# Patient Record
Sex: FEMALE | Race: WHITE | URBAN - METROPOLITAN AREA
[De-identification: names, ages, dates, MRNs, and addresses within clinical notes are randomized per-mention and may not be internally consistent; named-entity substitution may affect disease eponyms.]

---

## 2019-04-22 ENCOUNTER — HOSPITAL ENCOUNTER (EMERGENCY)
Age: 34
Discharge: HOME | End: 2019-04-22
Attending: EMERGENCY MEDICINE
Payer: COMMERCIAL

## 2019-04-22 ENCOUNTER — APPOINTMENT (OUTPATIENT)
Dept: GENERAL RADIOLOGY | Age: 34
End: 2019-04-22
Payer: COMMERCIAL

## 2019-04-22 VITALS
TEMPERATURE: 98.1 F | RESPIRATION RATE: 18 BRPM | HEIGHT: 64 IN | HEART RATE: 76 BPM | OXYGEN SATURATION: 100 % | DIASTOLIC BLOOD PRESSURE: 77 MMHG | BODY MASS INDEX: 18.78 KG/M2 | SYSTOLIC BLOOD PRESSURE: 131 MMHG | WEIGHT: 110 LBS

## 2019-04-22 DIAGNOSIS — Z87.01 H/O: PNEUMONIA: ICD-10-CM

## 2019-04-22 DIAGNOSIS — R07.9 CHEST PAIN, UNSPECIFIED TYPE: Primary | ICD-10-CM

## 2019-04-22 DIAGNOSIS — E83.42 HYPOMAGNESEMIA: ICD-10-CM

## 2019-04-22 LAB
A/G RATIO: 1.6 (ref 1.1–2.2)
ALBUMIN SERPL-MCNC: 4.2 G/DL (ref 3.4–5)
ALP BLD-CCNC: 43 U/L (ref 40–129)
ALT SERPL-CCNC: 21 U/L (ref 10–40)
ANION GAP SERPL CALCULATED.3IONS-SCNC: 15 MMOL/L (ref 3–16)
AST SERPL-CCNC: 41 U/L (ref 15–37)
BASOPHILS ABSOLUTE: 0 K/UL (ref 0–0.2)
BASOPHILS RELATIVE PERCENT: 0.3 %
BILIRUB SERPL-MCNC: 0.4 MG/DL (ref 0–1)
BUN BLDV-MCNC: 10 MG/DL (ref 7–20)
CALCIUM SERPL-MCNC: 8.9 MG/DL (ref 8.3–10.6)
CHLORIDE BLD-SCNC: 105 MMOL/L (ref 99–110)
CO2: 20 MMOL/L (ref 21–32)
CREAT SERPL-MCNC: 0.6 MG/DL (ref 0.6–1.1)
EKG ATRIAL RATE: 65 BPM
EKG DIAGNOSIS: NORMAL
EKG P AXIS: 1 DEGREES
EKG P-R INTERVAL: 172 MS
EKG Q-T INTERVAL: 468 MS
EKG QRS DURATION: 82 MS
EKG QTC CALCULATION (BAZETT): 486 MS
EKG R AXIS: 75 DEGREES
EKG T AXIS: 52 DEGREES
EKG VENTRICULAR RATE: 65 BPM
EOSINOPHILS ABSOLUTE: 0 K/UL (ref 0–0.6)
EOSINOPHILS RELATIVE PERCENT: 0 %
GFR AFRICAN AMERICAN: >60
GFR NON-AFRICAN AMERICAN: >60
GLOBULIN: 2.6 G/DL
GLUCOSE BLD-MCNC: 116 MG/DL (ref 70–99)
HCG QUALITATIVE: NEGATIVE
HCT VFR BLD CALC: 38.6 % (ref 36–48)
HEMOGLOBIN: 12.8 G/DL (ref 12–16)
LYMPHOCYTES ABSOLUTE: 1.2 K/UL (ref 1–5.1)
LYMPHOCYTES RELATIVE PERCENT: 13 %
MAGNESIUM: 1.7 MG/DL (ref 1.8–2.4)
MCH RBC QN AUTO: 30.3 PG (ref 26–34)
MCHC RBC AUTO-ENTMCNC: 33.2 G/DL (ref 31–36)
MCV RBC AUTO: 91.2 FL (ref 80–100)
MONOCYTES ABSOLUTE: 0.4 K/UL (ref 0–1.3)
MONOCYTES RELATIVE PERCENT: 4.4 %
NEUTROPHILS ABSOLUTE: 7.7 K/UL (ref 1.7–7.7)
NEUTROPHILS RELATIVE PERCENT: 82.3 %
PDW BLD-RTO: 14 % (ref 12.4–15.4)
PLATELET # BLD: 240 K/UL (ref 135–450)
PMV BLD AUTO: 9.1 FL (ref 5–10.5)
POTASSIUM REFLEX MAGNESIUM: 3.5 MMOL/L (ref 3.5–5.1)
RBC # BLD: 4.24 M/UL (ref 4–5.2)
SODIUM BLD-SCNC: 140 MMOL/L (ref 136–145)
TOTAL PROTEIN: 6.8 G/DL (ref 6.4–8.2)
TROPONIN: <0.01 NG/ML
WBC # BLD: 9.3 K/UL (ref 4–11)

## 2019-04-22 PROCEDURE — 85025 COMPLETE CBC W/AUTO DIFF WBC: CPT

## 2019-04-22 PROCEDURE — 71046 X-RAY EXAM CHEST 2 VIEWS: CPT

## 2019-04-22 PROCEDURE — 93010 ELECTROCARDIOGRAM REPORT: CPT | Performed by: INTERNAL MEDICINE

## 2019-04-22 PROCEDURE — 84484 ASSAY OF TROPONIN QUANT: CPT

## 2019-04-22 PROCEDURE — 6360000002 HC RX W HCPCS: Performed by: NURSE PRACTITIONER

## 2019-04-22 PROCEDURE — 96374 THER/PROPH/DIAG INJ IV PUSH: CPT

## 2019-04-22 PROCEDURE — 83735 ASSAY OF MAGNESIUM: CPT

## 2019-04-22 PROCEDURE — 93005 ELECTROCARDIOGRAM TRACING: CPT | Performed by: EMERGENCY MEDICINE

## 2019-04-22 PROCEDURE — 84703 CHORIONIC GONADOTROPIN ASSAY: CPT

## 2019-04-22 PROCEDURE — 80053 COMPREHEN METABOLIC PANEL: CPT

## 2019-04-22 PROCEDURE — 99285 EMERGENCY DEPT VISIT HI MDM: CPT

## 2019-04-22 PROCEDURE — 6370000000 HC RX 637 (ALT 250 FOR IP): Performed by: EMERGENCY MEDICINE

## 2019-04-22 RX ORDER — LORAZEPAM 2 MG/ML
1 INJECTION INTRAMUSCULAR ONCE
Status: COMPLETED | OUTPATIENT
Start: 2019-04-22 | End: 2019-04-22

## 2019-04-22 RX ADMIN — LORAZEPAM 1 MG: 2 INJECTION INTRAMUSCULAR; INTRAVENOUS at 14:45

## 2019-04-22 RX ADMIN — Medication 400 MG: at 15:56

## 2019-04-22 ASSESSMENT — ENCOUNTER SYMPTOMS
SHORTNESS OF BREATH: 0
NAUSEA: 0
CONSTIPATION: 0
DIARRHEA: 0
BLOOD IN STOOL: 0
VOMITING: 0
RHINORRHEA: 0
SORE THROAT: 0
ABDOMINAL PAIN: 0

## 2019-04-22 ASSESSMENT — PAIN DESCRIPTION - LOCATION: LOCATION: CHEST

## 2019-04-22 ASSESSMENT — PAIN SCALES - GENERAL: PAINLEVEL_OUTOF10: 9

## 2019-04-22 ASSESSMENT — HEART SCORE: ECG: 0

## 2019-04-22 ASSESSMENT — PAIN DESCRIPTION - ORIENTATION: ORIENTATION: MID

## 2019-04-22 NOTE — ED PROVIDER NOTES
[address] [facility]        Pt Name: Marissa Salvador  MRN: [id number]  [place] 1985  Date of evaluation: 4/22/2019  Provider: OVIDIO Shearer - MELIDA  PCP: Bambi Sosa [id number] [facility] 9 COMPLAINT       Chief Complaint   Patient presents with    Chest Pain     pt brought in by [facility] from [facility]. Pt c/o CP. Per squad pt reports she has been to [address] x3 since Sunday 4/21. Yesterday was dx with Pneumonia but sts \"i didnt fill my medication cause i felt too bad. \" Pt was dx with anxiety at [address] this am. Pt sts \"they didnt do anything. \" Pt NSR during triage but continues to say \"its doing the going tight thing.' EKG done at arrival.        HISTORY OF PRESENT ILLNESS   (Location/Symptom, Timing/Onset,Context/Setting, Quality, Duration, Modifying Factors, Severity)  Note limiting factors. Marissa Salvador is a 30 y.o. female who presents here with concern for chest pain. This occurred while she was at the pharmacy picking up a prescription for pneumonia. She reports it was left-sided and caused her to pass out. She also reports that anxiety. She denies fever, rash, headaches, dizziness, shortness of breath, cough, congestion, abdominal pain, nausea, vomiting, diarrhea, constipation, blood in the stool, or painful urination. No family at bedside. Nursing Notes triage note reviewed and agreed with or any disagreements were addressed  in the HPI. REVIEW OF SYSTEMS    (2-9 systems for level 4, 10 or more for level 5)     Review of Systems   Constitutional: Negative for chills and fever. HENT: Negative for postnasal drip, rhinorrhea and sore throat. Eyes: Negative for visual disturbance. Respiratory: Negative for shortness of breath. Cardiovascular: Positive for chest pain. Gastrointestinal: Negative for abdominal pain, blood in stool, constipation, diarrhea, nausea and vomiting.    Genitourinary: Negative for dysuria, flank pain and hematuria. Skin: Negative for rash. Neurological: Negative for weakness and headaches. Psychiatric/Behavioral: The patient is nervous/anxious. All other systems reviewed and are negative. Positives and Pertinent negatives as per HPI. Except as noted above in the ROS, all other systems were reviewed and negative. PAST MEDICAL HISTORY     Past Medical History:   Diagnosis Date    ADHD (attention deficit hyperactivity disorder)     Asthma     Collapsed lung     Drug abuse ([facility] [address]     Kidney stones     Pituitary tumor     PNA (pneumonia)     Tonsillitis          SURGICAL HISTORY       Past Surgical History:   Procedure Laterality Date    FOOT SURGERY      RIGHT -- UNSURE WHY    MOUTH SURGERY           CURRENT MEDICATIONS       Previous Medications    IBUPROFEN (ADVIL;MOTRIN) 600 MG TABLET    Take 1 tablet by mouth every 8 hours as needed for Pain         ALLERGIES     Adhesive tape; Amoxicillin; Benadryl [diphenhydramine]; Decadron [dexamethasone]; Penicillins; Phenergan [promethazine hcl]; Prochlorperazine edisylate; Reglan [metoclopramide hcl]; and Stadol [butorphanol tartrate]    FAMILY HISTORY     History reviewed. No pertinent family history.        SOCIAL HISTORY       Social History     Socioeconomic History    Marital status: Legally      Spouse name: None    Number of children: None    Years of education: None    Highest education level: None   Occupational History    None   Social Needs    Financial resource strain: None    Food insecurity:     Worry: None     Inability: None    Transportation needs:     Medical: None     Non-medical: None   Tobacco Use    Smoking status: Current Every Day Smoker     Packs/day: 0.50     Types: Cigarettes    Smokeless tobacco: Never Used   Substance and Sexual Activity    Alcohol use: Yes     Comment: once every few months    Drug use: No    Sexual activity: None   Lifestyle    Physical activity: Days per week: None     Minutes per session: None    Stress: None   Relationships    Social connections:     Talks on phone: None     Gets together: None     Attends Temple service: None     Active member of club or organization: None     Attends meetings of clubs or organizations: None     Relationship status: None    Intimate partner violence:     Fear of current or ex partner: None     Emotionally abused: None     Physically abused: None     Forced sexual activity: None   Other Topics Concern    None   Social History Narrative    None       SCREENINGS             PHYSICAL EXAM  (up to 7 for level 4, 8 or more for level 5)     ED Triage Vitals   BP Temp Temp Source Pulse Resp SpO2 Height Weight   04/22/19 1400 04/22/19 1403 04/22/19 1403 04/22/19 1403 04/22/19 1403 04/22/19 1403 04/22/19 1403 04/22/19 1403   (!) 147/99 98.1 °F (36.7 °C) Oral 70 20 100 % 5' 4\" (1.626 m) 110 lb (49.9 kg)       Physical Exam   Constitutional: She is oriented to person, place, and time. She appears well-developed and well-nourished. No distress. HENT:   Head: Normocephalic and atraumatic. Eyes: Right eye exhibits no discharge. Left eye exhibits no discharge. No scleral icterus. Neck: Normal range of motion. Neck supple. Cardiovascular: Normal rate, regular rhythm, normal heart sounds and intact distal pulses. Exam reveals no gallop and no friction rub. No murmur heard. Pulmonary/Chest: Effort normal and breath sounds normal. No stridor. No respiratory distress. She has no wheezes. She has no rales. She exhibits no tenderness. Abdominal: Soft. Bowel sounds are normal. She exhibits no distension and no mass. There is no tenderness. There is no rebound and no guarding. Musculoskeletal: Normal range of motion. She exhibits no edema or tenderness. Neurological: She is alert and oriented to person, place, and time. Coordination normal.   Skin: Skin is warm and dry. She is not diaphoretic. No pallor.    Psychiatric: She has a normal mood and affect. Her behavior is normal.   Nursing note and vitals reviewed. DIAGNOSTIC RESULTS   LABS:    Labs Reviewed   COMPREHENSIVE METABOLIC PANEL W/ REFLEX TO MG FOR LOW K - Abnormal; Notable for the following components:       Result Value    CO2 20 (*)     Glucose 116 (*)     AST 41 (*)     All other components within normal limits    Narrative:     Performed at:  [facility] [address],  [facility], [address]   Phone [phone]   MAGNESIUM - Abnormal; Notable for the following components:    Magnesium 1.70 (*)     All other components within normal limits    Narrative:     Performed at:  [facility] [address],  [facility], [address]   Phone [phone]   CBC WITH AUTO DIFFERENTIAL    Narrative:     Performed at:  [facility] [address],  [facility], [address]   Phone [phone]   TROPONIN    Narrative:     Performed at:  [facility] [address],  [facility], [address]   Phone [phone]   HCG, SERUM, QUALITATIVE    Narrative:     Performed at:  [facility] [address],  [facility], [address]   Phone [phone]       All other labs werewithin normal range or not returned as of this dictation. EKG: All EKG's are interpreted by the Emergency Department Physician who either signs or Co-signs this chart in the absence of acardiologist.  Please see their note for interpretation of EKG. RADIOLOGY:   Interpretation per the Radiologist below, if available at the time of this note:    XR CHEST STANDARD (2 VW)   Preliminary Result   No evidence of acute cardiopulmonary disease. No results found.       PROCEDURES   Unless otherwise noted below, none     Procedures    CRITICAL CARE TIME     n/a    CONSULTS:  None      EMERGENCY DEPARTMENT COURSE and DIFFERENTIAL DIAGNOSIS/MDM:   Vitals: Vitals:    04/22/19 1400 04/22/19 1403 04/22/19 1415   BP: (!) 147/99 (!) 141/91    Pulse:  70 65   Resp:  20 18   Temp:  98.1 °F (36.7 °C)    TempSrc:  Oral    SpO2:  100% 100%   Weight:  110 lb (49.9 kg)    Height:  5' 4\" (1.626 m)        Sinai Hadley was given the following medications:  Medications   magnesium oxide (MAG-OX) tablet 400 mg (has no administration in time range)   LORazepam (ATIVAN) injection 1 mg (1 mg Intravenous Given 4/22/19 1445)       Sinai Hadley was evaluated in the emergency department with concern for chest pain. She reports anxiety in the ER. She was Ativan. Identified to have low magnesium and was treated in the ER for this. Otherwise workup is benign. Her EKG is nonischemic. Troponin is not elevated. I doubt emergent intrathoracic process including but not limited to coronary ischemia, myocardial infarction, cardiac tamponade, pulmonary embolism, thoracic aortic dissection, significant pericarditis, pneumonia, pneumothorax, or acute abdomen. Sinai Hadley is stable in the ER and safe to follow as an outpatient. The patient is discharged on the following medications. They were counseled on how to take the newly prescribed medications:  New Prescriptions    No medications on file    . Instructed to follow-up with:  [address]  Call in 1 day  to schedule an appointment with a new primary care provider    Return to the ER for new or worsening symptoms. This plan was discussed with the patient and all family available in the room at discharge who are all in agreement with the plan. The patient tolerated their visit well. They were seen and evaluated by the attending physician, Danielle Ley MD , who agreed with the assessment and plan. The patient and / or the family were informed of the results of any tests, a time was given to answer questions, a plan was proposed and they agreed with plan.         FINAL IMPRESSION 1. Chest pain, unspecified type    2. H/O: pneumonia    3.  Hypomagnesemia          DISPOSITION/PLAN   DISPOSITION Decision To Discharge 04/22/2019 03:34:42 PM        DISCONTINUED MEDICATIONS:  Discontinued Medications    No medications on file                (Please note that portions of this note were completed with a voice recognition program.  Efforts were made to edit the dictations but occasionally words are mis-transcribed.)    OVIDIO Shearer CNP (electronically signed)        VOIDIO Shearer CNP  04/22/19 9835

## 2019-04-22 NOTE — ED PROVIDER NOTES
I independently performed a history and physical on [employer]. All diagnostic, treatment, and disposition decisions were made by myself in conjunction with the advanced practice provider. I have participated in the medical decision making and directed the treatment plan and disposition of the patient. For further details of [address] emergency department encounter, please see the advanced practice provider's documentation. CHIEF COMPLAINT  Chief Complaint   Patient presents with    Chest Pain     pt brought in by [facility] from [facility]. Pt c/o CP. Per silvano pt reports she has been to [address] x3 since Sunday 4/21. Yesterday was dx with Pneumonia but sts \"i didnt fill my medication cause i felt too bad. \" Pt was dx with anxiety at [address] this am. Pt sts \"they didnt do anything. \" Pt NSR during triage but continues to say \"its doing the going tight thing.' EKG done at arrival.        Briefly, [employer] is a 30 y.o. female  who presents to the ED complaining of chest pains. She reports diffuse paresthesias. No fevers. She feels very anxious but is vague about what actually makes her anxious. She has some nausea. She was filling a prescription for a possible pneumonia from outside hospital.  This is based on an equivocal finding on OSH CXR. FOCUSED PHYSICAL EXAMINATION  BP (!) 141/91   Pulse 65   Temp 98.1 °F (36.7 °C) (Oral)   Resp 18   Ht 5' 4\" (1.626 m)   Wt 110 lb (49.9 kg)   SpO2 100%   BMI 18.88 kg/m²    Focused physical examination notable for no acute distress, well-appearing, well-nourished, normal speech and mentation without obvious facial droop, no obvious rash. No obvious cranial nerve deficits on my initial exam. RRR CTAB. No chest wall ttp. No leg swelling.     The 12 lead EKG was interpreted by me as follows:  Rate: normal with a rate of 65  Rhythm: sinus  Axis: normal  Intervals: normal ND, narrow QRS, normal QTc  ST segments: no ST elevations or depressions  T waves: no abnormal inversions  Non-specific T wave changes: not present  Prior EKG comparison: EKG dated 4/14/14 is not significantly different    MDM:  Diagnostic considerations included acute coronary syndrome, pulmonary embolism, COPD/asthma, pneumonia, musculoskeletal, reflux/PUD/gastritis, pneumothorax, CHF, thoracic aortic dissection, anxiety    ED course was notable for chest discomfort, probable anxiety driven, fourth ER visit for somewhat chronic problem in the past 48 hours. Already has outpt rx for abx but CXR here is clear. CareEverywhere reviewed. Minimally low magnesium levels repleted by mouth. New primary care referral was made at her request.  PERC neg. Trop neg, EKG unremarkable and low HEART score. PERC Rule:  Applicable in this patient who has low clinical suspicion for pulmonary embolism. Age < 48years old: Yes  Heart rate < 100 bpm: Yes  Oxygen saturation > 95%: Yes  Hemoptysis: No  Exogenous estrogen use: No  Prior history of DVT or PE: No  Unilateral leg swelling: No  Surgery or significant trauma in the past 4 weeks: No    Based on the above, PE can effectively be ruled out without further testing. During the patient's ED course, the patient was given:  Medications   magnesium oxide (MAG-OX) tablet 400 mg (has no administration in time range)   LORazepam (ATIVAN) injection 1 mg (1 mg Intravenous Given 4/22/19 1445)        CLINICAL IMPRESSION  1. Chest pain, unspecified type    2. H/O: pneumonia    3. Hypomagnesemia        DISPOSITION  Gloria Portillo was discharged to home in stable condition. I have discussed the findings of today's workup with the patient and addressed the patient's questions and concerns. Important warning signs as well as new or worsening symptoms which would necessitate immediate return to the ED were discussed. The plan is to discharge from the ED at this time, and the patient is in stable condition.   The patient acknowledged

## 2019-04-22 NOTE — ED NOTES
Pt d/c instructions given, v/u. IV removed without complications. A&O with no signs of distress. Denies needs at this time. Pt wheeled to exit.         Andree Altamirano RN  04/22/19 4897

## 2019-04-22 NOTE — ED NOTES
Pt a&o x4. Pt c/o CP x1 week. Pt has had recent cough and dx with Pneumonia yesterday at [address]. Has had 3 visits to [facility] since Sunday 4/21 AM. Pt was d/c last night and returned this AM to [address] and dx with anxiety. Pt has hx of anxiety. IV established by squads without complications, blood work obtained by this RN and sent to lab. Pt tolerated well. Pt placed on appropriate monitors and cycling. NSR with a HR of 60s-70s. ST segment alarm enabled. Pt refused to place on a gown at this time. Pt laying supine in bed in low position, call light in reach, side rails up x2. Pt showing no signs of distress at this time. Breathing easy and unlabored. Will continue to monitor.         Shannon Rose RN  04/22/19 7636

## 2019-04-22 NOTE — ED NOTES
Bed: 06  Expected date:   Expected time:   Means of arrival: [facility]  Comments:  Mu Toro  04/22/19 5490

## 2020-03-16 ENCOUNTER — APPOINTMENT (OUTPATIENT)
Dept: GENERAL RADIOLOGY | Age: 35
DRG: 383 | End: 2020-03-16
Payer: COMMERCIAL

## 2020-03-16 ENCOUNTER — HOSPITAL ENCOUNTER (EMERGENCY)
Age: 35
Discharge: HOME | DRG: 383 | End: 2020-03-16
Attending: EMERGENCY MEDICINE
Payer: COMMERCIAL

## 2020-03-16 ENCOUNTER — HOSPITAL ENCOUNTER (INPATIENT)
Age: 35
LOS: 1 days | Discharge: LEFT BEFORE DISCHARGE | DRG: 383 | End: 2020-03-17
Attending: EMERGENCY MEDICINE | Admitting: INTERNAL MEDICINE
Payer: COMMERCIAL

## 2020-03-16 VITALS
HEIGHT: 64 IN | WEIGHT: 110 LBS | RESPIRATION RATE: 18 BRPM | DIASTOLIC BLOOD PRESSURE: 89 MMHG | HEART RATE: 70 BPM | SYSTOLIC BLOOD PRESSURE: 125 MMHG | BODY MASS INDEX: 18.78 KG/M2 | OXYGEN SATURATION: 98 % | TEMPERATURE: 98.7 F

## 2020-03-16 PROBLEM — L03.90 CELLULITIS: Status: ACTIVE | Noted: 2020-03-16

## 2020-03-16 LAB
ANION GAP SERPL CALCULATED.3IONS-SCNC: 19 MMOL/L (ref 3–16)
BASOPHILS ABSOLUTE: 0.1 K/UL (ref 0–0.2)
BASOPHILS RELATIVE PERCENT: 0.6 %
BUN BLDV-MCNC: 18 MG/DL (ref 7–20)
C-REACTIVE PROTEIN: 11.1 MG/L (ref 0–5.1)
CALCIUM SERPL-MCNC: 9.6 MG/DL (ref 8.3–10.6)
CHLORIDE BLD-SCNC: 102 MMOL/L (ref 99–110)
CO2: 19 MMOL/L (ref 21–32)
CREAT SERPL-MCNC: 0.7 MG/DL (ref 0.6–1.1)
EOSINOPHILS ABSOLUTE: 0.1 K/UL (ref 0–0.6)
EOSINOPHILS RELATIVE PERCENT: 0.5 %
GFR AFRICAN AMERICAN: >60
GFR NON-AFRICAN AMERICAN: >60
GLUCOSE BLD-MCNC: 96 MG/DL (ref 70–99)
HCT VFR BLD CALC: 35.7 % (ref 36–48)
HEMOGLOBIN: 12 G/DL (ref 12–16)
LACTIC ACID, SEPSIS: 1.8 MMOL/L (ref 0.4–1.9)
LYMPHOCYTES ABSOLUTE: 2.5 K/UL (ref 1–5.1)
LYMPHOCYTES RELATIVE PERCENT: 21.8 %
MCH RBC QN AUTO: 29.6 PG (ref 26–34)
MCHC RBC AUTO-ENTMCNC: 33.5 G/DL (ref 31–36)
MCV RBC AUTO: 88.4 FL (ref 80–100)
MONOCYTES ABSOLUTE: 0.7 K/UL (ref 0–1.3)
MONOCYTES RELATIVE PERCENT: 6.2 %
NEUTROPHILS ABSOLUTE: 8.1 K/UL (ref 1.7–7.7)
NEUTROPHILS RELATIVE PERCENT: 70.9 %
PDW BLD-RTO: 16 % (ref 12.4–15.4)
PLATELET # BLD: 336 K/UL (ref 135–450)
PMV BLD AUTO: 8.4 FL (ref 5–10.5)
POTASSIUM REFLEX MAGNESIUM: 3.6 MMOL/L (ref 3.5–5.1)
RBC # BLD: 4.04 M/UL (ref 4–5.2)
SEDIMENTATION RATE, ERYTHROCYTE: 29 MM/HR (ref 0–20)
SODIUM BLD-SCNC: 140 MMOL/L (ref 136–145)
WBC # BLD: 11.5 K/UL (ref 4–11)

## 2020-03-16 PROCEDURE — 6360000002 HC RX W HCPCS: Performed by: PHYSICIAN ASSISTANT

## 2020-03-16 PROCEDURE — 90471 IMMUNIZATION ADMIN: CPT | Performed by: PHYSICIAN ASSISTANT

## 2020-03-16 PROCEDURE — 85025 COMPLETE CBC W/AUTO DIFF WBC: CPT

## 2020-03-16 PROCEDURE — 99284 EMERGENCY DEPT VISIT MOD MDM: CPT

## 2020-03-16 PROCEDURE — 83605 ASSAY OF LACTIC ACID: CPT

## 2020-03-16 PROCEDURE — 2580000003 HC RX 258: Performed by: INTERNAL MEDICINE

## 2020-03-16 PROCEDURE — 1200000000 HC SEMI PRIVATE

## 2020-03-16 PROCEDURE — 90675 RABIES VACCINE IM: CPT | Performed by: PHYSICIAN ASSISTANT

## 2020-03-16 PROCEDURE — 86140 C-REACTIVE PROTEIN: CPT

## 2020-03-16 PROCEDURE — 2580000003 HC RX 258: Performed by: PHYSICIAN ASSISTANT

## 2020-03-16 PROCEDURE — 87040 BLOOD CULTURE FOR BACTERIA: CPT

## 2020-03-16 PROCEDURE — G0378 HOSPITAL OBSERVATION PER HR: HCPCS

## 2020-03-16 PROCEDURE — 99283 EMERGENCY DEPT VISIT LOW MDM: CPT

## 2020-03-16 PROCEDURE — 96365 THER/PROPH/DIAG IV INF INIT: CPT

## 2020-03-16 PROCEDURE — 96372 THER/PROPH/DIAG INJ SC/IM: CPT

## 2020-03-16 PROCEDURE — 73130 X-RAY EXAM OF HAND: CPT

## 2020-03-16 PROCEDURE — 6370000000 HC RX 637 (ALT 250 FOR IP): Performed by: INTERNAL MEDICINE

## 2020-03-16 PROCEDURE — 6360000002 HC RX W HCPCS: Performed by: INTERNAL MEDICINE

## 2020-03-16 PROCEDURE — 6370000000 HC RX 637 (ALT 250 FOR IP): Performed by: NURSE PRACTITIONER

## 2020-03-16 PROCEDURE — 96376 TX/PRO/DX INJ SAME DRUG ADON: CPT

## 2020-03-16 PROCEDURE — 96368 THER/DIAG CONCURRENT INF: CPT

## 2020-03-16 PROCEDURE — 80048 BASIC METABOLIC PNL TOTAL CA: CPT

## 2020-03-16 PROCEDURE — 90375 RABIES IG IM/SC: CPT | Performed by: PHYSICIAN ASSISTANT

## 2020-03-16 PROCEDURE — 80307 DRUG TEST PRSMV CHEM ANLYZR: CPT

## 2020-03-16 PROCEDURE — 85652 RBC SED RATE AUTOMATED: CPT

## 2020-03-16 PROCEDURE — 96375 TX/PRO/DX INJ NEW DRUG ADDON: CPT

## 2020-03-16 PROCEDURE — 99219 PR INITIAL OBSERVATION CARE/DAY 50 MINUTES: CPT | Performed by: INTERNAL MEDICINE

## 2020-03-16 PROCEDURE — 96366 THER/PROPH/DIAG IV INF ADDON: CPT

## 2020-03-16 PROCEDURE — 96367 TX/PROPH/DG ADDL SEQ IV INF: CPT

## 2020-03-16 PROCEDURE — 6370000000 HC RX 637 (ALT 250 FOR IP): Performed by: PHYSICIAN ASSISTANT

## 2020-03-16 RX ORDER — ACETAMINOPHEN, ASPIRIN AND CAFFEINE 250; 250; 65 MG/1; MG/1; MG/1
1 TABLET, FILM COATED ORAL EVERY 6 HOURS PRN
Status: ON HOLD | COMMUNITY
End: 2021-05-27

## 2020-03-16 RX ORDER — HYDROXYZINE HYDROCHLORIDE 10 MG/1
10 TABLET, FILM COATED ORAL ONCE
Status: COMPLETED | OUTPATIENT
Start: 2020-03-16 | End: 2020-03-16

## 2020-03-16 RX ORDER — METRONIDAZOLE 250 MG/1
500 TABLET ORAL EVERY 8 HOURS SCHEDULED
Status: DISCONTINUED | OUTPATIENT
Start: 2020-03-16 | End: 2020-03-17 | Stop reason: HOSPADM

## 2020-03-16 RX ORDER — ACETAMINOPHEN 650 MG/1
650 SUPPOSITORY RECTAL EVERY 6 HOURS PRN
Status: DISCONTINUED | OUTPATIENT
Start: 2020-03-16 | End: 2020-03-17 | Stop reason: HOSPADM

## 2020-03-16 RX ORDER — MORPHINE SULFATE 4 MG/ML
4 INJECTION, SOLUTION INTRAMUSCULAR; INTRAVENOUS
Status: DISCONTINUED | OUTPATIENT
Start: 2020-03-16 | End: 2020-03-17

## 2020-03-16 RX ORDER — IBUPROFEN 600 MG/1
600 TABLET ORAL ONCE
Status: COMPLETED | OUTPATIENT
Start: 2020-03-16 | End: 2020-03-16

## 2020-03-16 RX ORDER — ACETAMINOPHEN 500 MG
1000 TABLET ORAL ONCE
Status: DISCONTINUED | OUTPATIENT
Start: 2020-03-16 | End: 2020-03-16 | Stop reason: HOSPADM

## 2020-03-16 RX ORDER — SODIUM CHLORIDE 0.9 % (FLUSH) 0.9 %
10 SYRINGE (ML) INJECTION EVERY 12 HOURS SCHEDULED
Status: DISCONTINUED | OUTPATIENT
Start: 2020-03-16 | End: 2020-03-17 | Stop reason: HOSPADM

## 2020-03-16 RX ORDER — SODIUM CHLORIDE 0.9 % (FLUSH) 0.9 %
10 SYRINGE (ML) INJECTION PRN
Status: DISCONTINUED | OUTPATIENT
Start: 2020-03-16 | End: 2020-03-17 | Stop reason: HOSPADM

## 2020-03-16 RX ORDER — ONDANSETRON 2 MG/ML
4 INJECTION INTRAMUSCULAR; INTRAVENOUS ONCE
Status: COMPLETED | OUTPATIENT
Start: 2020-03-16 | End: 2020-03-16

## 2020-03-16 RX ORDER — POLYETHYLENE GLYCOL 3350 17 G/17G
17 POWDER, FOR SOLUTION ORAL DAILY PRN
Status: DISCONTINUED | OUTPATIENT
Start: 2020-03-16 | End: 2020-03-17 | Stop reason: HOSPADM

## 2020-03-16 RX ORDER — ACETAMINOPHEN 325 MG/1
650 TABLET ORAL EVERY 6 HOURS PRN
Status: DISCONTINUED | OUTPATIENT
Start: 2020-03-16 | End: 2020-03-17 | Stop reason: HOSPADM

## 2020-03-16 RX ORDER — DOXYCYCLINE HYCLATE 100 MG
100 TABLET ORAL ONCE
Status: COMPLETED | OUTPATIENT
Start: 2020-03-16 | End: 2020-03-16

## 2020-03-16 RX ORDER — CIPROFLOXACIN 500 MG/1
500 TABLET, FILM COATED ORAL EVERY 12 HOURS SCHEDULED
Status: DISCONTINUED | OUTPATIENT
Start: 2020-03-16 | End: 2020-03-17 | Stop reason: HOSPADM

## 2020-03-16 RX ORDER — ONDANSETRON 4 MG/1
4 TABLET, FILM COATED ORAL EVERY 12 HOURS PRN
Status: ON HOLD | COMMUNITY
End: 2021-05-27

## 2020-03-16 RX ADMIN — Medication 10 ML: at 11:46

## 2020-03-16 RX ADMIN — VANCOMYCIN HYDROCHLORIDE 750 MG: 750 INJECTION, POWDER, LYOPHILIZED, FOR SOLUTION INTRAVENOUS at 18:14

## 2020-03-16 RX ADMIN — CEFTRIAXONE 2 G: 2 INJECTION, POWDER, FOR SOLUTION INTRAMUSCULAR; INTRAVENOUS at 06:28

## 2020-03-16 RX ADMIN — HYDROXYZINE HYDROCHLORIDE 10 MG: 10 TABLET, FILM COATED ORAL at 21:52

## 2020-03-16 RX ADMIN — MORPHINE SULFATE 4 MG: 4 INJECTION INTRAVENOUS at 06:28

## 2020-03-16 RX ADMIN — VANCOMYCIN HYDROCHLORIDE 750 MG: 750 INJECTION, POWDER, LYOPHILIZED, FOR SOLUTION INTRAVENOUS at 06:34

## 2020-03-16 RX ADMIN — MORPHINE SULFATE 4 MG: 4 INJECTION INTRAVENOUS at 14:28

## 2020-03-16 RX ADMIN — MORPHINE SULFATE 4 MG: 4 INJECTION INTRAVENOUS at 18:15

## 2020-03-16 RX ADMIN — DOXYCYCLINE HYCLATE 100 MG: 100 TABLET, COATED ORAL at 02:40

## 2020-03-16 RX ADMIN — IBUPROFEN 600 MG: 600 TABLET, FILM COATED ORAL at 01:11

## 2020-03-16 RX ADMIN — MORPHINE SULFATE 4 MG: 4 INJECTION INTRAVENOUS at 21:52

## 2020-03-16 RX ADMIN — ONDANSETRON 4 MG: 2 INJECTION INTRAMUSCULAR; INTRAVENOUS at 06:28

## 2020-03-16 RX ADMIN — METRONIDAZOLE 500 MG: 250 TABLET, FILM COATED ORAL at 16:31

## 2020-03-16 RX ADMIN — MORPHINE SULFATE 4 MG: 4 INJECTION INTRAVENOUS at 11:44

## 2020-03-16 RX ADMIN — CIPROFLOXACIN HYDROCHLORIDE 500 MG: 500 TABLET, FILM COATED ORAL at 16:28

## 2020-03-16 RX ADMIN — Medication 1 G: at 08:37

## 2020-03-16 RX ADMIN — RABIES VACCINE 1 ML: KIT at 02:24

## 2020-03-16 RX ADMIN — MORPHINE SULFATE 4 MG: 4 INJECTION INTRAVENOUS at 08:38

## 2020-03-16 RX ADMIN — Medication 10 ML: at 08:38

## 2020-03-16 RX ADMIN — METRONIDAZOLE 500 MG: 250 TABLET, FILM COATED ORAL at 23:15

## 2020-03-16 RX ADMIN — Medication 10 ML: at 18:16

## 2020-03-16 RX ADMIN — RABIES IMMUNE GLOBULIN (HUMAN) 1050 UNITS: 300 INJECTION, SOLUTION INFILTRATION; INTRAMUSCULAR at 02:25

## 2020-03-16 ASSESSMENT — ENCOUNTER SYMPTOMS
STRIDOR: 0
EYE DISCHARGE: 0
COUGH: 0
FACIAL SWELLING: 0
COLOR CHANGE: 0
BACK PAIN: 0
RHINORRHEA: 0
COLOR CHANGE: 1
VOMITING: 0
BLOOD IN STOOL: 0
TROUBLE SWALLOWING: 0
SHORTNESS OF BREATH: 0
PHOTOPHOBIA: 0
APNEA: 0
DIARRHEA: 0
CHOKING: 0
ABDOMINAL PAIN: 0
CHEST TIGHTNESS: 0
NAUSEA: 0
EYE REDNESS: 0

## 2020-03-16 ASSESSMENT — PAIN DESCRIPTION - PROGRESSION
CLINICAL_PROGRESSION: GRADUALLY WORSENING
CLINICAL_PROGRESSION: GRADUALLY WORSENING

## 2020-03-16 ASSESSMENT — PAIN SCALES - GENERAL
PAINLEVEL_OUTOF10: 4
PAINLEVEL_OUTOF10: 0
PAINLEVEL_OUTOF10: 10
PAINLEVEL_OUTOF10: 7
PAINLEVEL_OUTOF10: 9
PAINLEVEL_OUTOF10: 7
PAINLEVEL_OUTOF10: 7
PAINLEVEL_OUTOF10: 10
PAINLEVEL_OUTOF10: 9
PAINLEVEL_OUTOF10: 10
PAINLEVEL_OUTOF10: 7

## 2020-03-16 ASSESSMENT — PAIN DESCRIPTION - PAIN TYPE: TYPE: ACUTE PAIN

## 2020-03-16 NOTE — PROGRESS NOTES
Shift assessment and AM vitals complete. Pain meds given to pt. Waiting for orthopedics to come see pt to see about surgery. The care plan and education has been reviewed and mutually agreed upon with the patient.

## 2020-03-16 NOTE — ED NOTES
Report called to RN on 4T. Pt alert and oriented and shows no signs of distress at time of transfer to  484. Pt taken but to room by WAYLON Hammer in wheelchair. Pt refusing gown at this time. Belongings bagged and sent with patient.        Hamlet Ellis RN  03/16/20 2472

## 2020-03-16 NOTE — H&P
agitation. Endocrine: Negative for polydipsia,polyuria,heat /cold intolerance. Past Medical History:   has a past medical history of ADHD (attention deficit hyperactivity disorder), Asthma, Collapsed lung, Drug abuse ([address] Kidney stones, Pituitary tumor, PNA (pneumonia), and Tonsillitis. Past Surgical History:   has a past surgical history that includes Foot surgery and Mouth surgery. Medications:  No current facility-administered medications on file prior to encounter. No current outpatient medications on file prior to encounter. Allergies: Allergies   Allergen Reactions    Adhesive Tape      IS OK WITH TEGADERM    Amoxicillin     Benadryl [Diphenhydramine]     Decadron [Dexamethasone]     Penicillins     Phenergan [Promethazine Hcl]     Prochlorperazine Edisylate     Reglan [Metoclopramide Hcl]     Stadol [Butorphanol Tartrate]         Social History:  Patient Lives at home   reports that she has been smoking cigarettes. She has been smoking about 0.50 packs per day. She has never used smokeless tobacco. She reports current alcohol use. She reports current drug use. Drug: Marijuana. Family History:  Father wit hDM    Physical Exam:  /74   Pulse 76   Temp 98.3 °F (36.8 °C)   Resp 14   Ht 5' 4\" (1.626 m)   Wt 110 lb (49.9 kg)   SpO2 98%   BMI 18.88 kg/m²     General appearance:  Appears comfortable. AAOx3  HEENT: atraumatic, Pupils equal, muscous membranes moist, no masses appreciated  Cardiovascular: Regular rate and rhythm no murmurs appreciated  Respiratory: CTAB no wheezing  Gastrointestinal: Abdomen soft, non-tender, BS+  EXT: no edema  Neurology: no gross focal deficts  Psychiatry: Appropriate affect.  Not agitated  Skin: right and left hand with scratches and erythema tender to palpation, unable to make fist with both hand and unable to move 1/2nd digit of left hand    Labs:  CBC:   Lab Results   Component Value Date    WBC 11.5 03/16/2020    RBC 4.04

## 2020-03-16 NOTE — ED NOTES
Patient unable to stay for admission at this time, patient states she will return after taking care of things at home, patient left AMA at this time      Nano Marquez RN  03/16/20 [id number]

## 2020-03-16 NOTE — ED PROVIDER NOTES
CONTINUE these medications which have NOT CHANGED    Details   ibuprofen (ADVIL;MOTRIN) 600 MG tablet Take 1 tablet by mouth every 8 hours as needed for Pain, Disp-20 tablet, R-0               ALLERGIES     Adhesive tape; Amoxicillin; Benadryl [diphenhydramine]; Decadron [dexamethasone]; Penicillins; Phenergan [promethazine hcl]; Prochlorperazine edisylate; Reglan [metoclopramide hcl]; and Stadol [butorphanol tartrate]    FAMILYHISTORY     No family history on file. SOCIAL HISTORY       Social History     Tobacco Use    Smoking status: Current Every Day Smoker     Packs/day: 0.50     Types: Cigarettes    Smokeless tobacco: Never Used   Substance Use Topics    Alcohol use: Yes     Comment: once every few months    Drug use: Yes     Types: Marijuana       SCREENINGS             PHYSICAL EXAM    (up to 7 for level 4, 8 or more for level 5)     ED Triage Vitals [03/16/20 0031]   BP Temp Temp src Pulse Resp SpO2 Height Weight   (!) 148/100 98.7 °F (37.1 °C) -- 105 18 99 % 5' 4\" (1.626 m) 110 lb (49.9 kg)       Physical Exam  Vitals signs and nursing note reviewed. Constitutional:       General: She is not in acute distress. Appearance: Normal appearance. She is well-developed. She is not ill-appearing, toxic-appearing or diaphoretic. HENT:      Head: Normocephalic and atraumatic. Nose: Nose normal.      Mouth/Throat:      Mouth: Mucous membranes are moist.      Pharynx: Oropharynx is clear. Eyes:      General:         Right eye: No discharge. Left eye: No discharge. Conjunctiva/sclera: Conjunctivae normal.      Pupils: Pupils are equal, round, and reactive to light. Neck:      Musculoskeletal: Normal range of motion and neck supple. Cardiovascular:      Rate and Rhythm: Normal rate and regular rhythm. Heart sounds: Normal heart sounds. No murmur. No gallop. Pulmonary:      Effort: Pulmonary effort is normal. No respiratory distress.       Breath sounds: Normal breath sounds. No wheezing or rales. Musculoskeletal: Normal range of motion. Left hand: She exhibits tenderness, laceration and swelling. Comments: Patient has significant tenderness on extension left index finger as well as palpation of the extensor tendon. Patient has extreme tenderness to movement of all 5 fingers to flexion and extension. Erythema only to the index finger of left hand. Swelling to left thumb and index finger. Skin:     General: Skin is warm and dry. Capillary Refill: Capillary refill takes less than 2 seconds. Coloration: Skin is not pale. Neurological:      General: No focal deficit present. Mental Status: She is alert and oriented to person, place, and time. Psychiatric:         Mood and Affect: Mood normal.         Behavior: Behavior normal.         DIAGNOSTIC RESULTS   LABS:    Labs Reviewed - No data to display    All other labs were within normal range or not returned as of this dictation. EKG: All EKG's are interpreted by the Emergency Department Physician in the absence of a cardiologist.  Please see their note for interpretation of EKG. RADIOLOGY:   Non-plain film images such as CT, Ultrasound and MRI are read by the radiologist. Plain radiographic images are visualized and preliminarily interpreted by the ED Provider with the below findings:        Interpretation per the Radiologist below, if available at the time of this note:    XR HAND LEFT (MIN 3 VIEWS)   Final Result   No acute osseous abnormality. No results found.         PROCEDURES   Unless otherwise noted below, none     Procedures    CRITICAL CARE TIME   N/A    CONSULTS:  IP CONSULT TO ORTHOPEDIC SURGERY      EMERGENCY DEPARTMENT COURSE and DIFFERENTIAL DIAGNOSIS/MDM:   Vitals:    Vitals:    03/16/20 0031 03/16/20 0254 03/16/20 0255 03/16/20 0303   BP: (!) 148/100 125/89     Pulse: 105   70   Resp: 18      Temp: 98.7 °F (37.1 °C)      SpO2: 99%  98%    Weight: 110 lb (49.9 kg)      Height: 5' 4\" (1.626 m)          Patient was given the following medications:  Medications   acetaminophen (TYLENOL) tablet 1,000 mg (1,000 mg Oral Not Given 3/16/20 0114)   ibuprofen (ADVIL;MOTRIN) tablet 600 mg (600 mg Oral Given 3/16/20 0111)   rabies immune globulin (HYPERRAB) 300 UNIT/ML injection 1,050 Units (1,050 Units Intramuscular Given 3/16/20 0225)   rabies vaccine, PCEC (RABAVERT) injection 1 mL (1 mL Intramuscular Given 3/16/20 0224)   doxycycline hyclate (VIBRA-TABS) tablet 100 mg (100 mg Oral Given 3/16/20 0240)       Patient presents emergency department for evaluation of multiple cat bite wounds to the left hand that occurred on Thursday evening. Patient has significant tenderness on extension of left index finger as well as palpation of the extensor tendon. Patient has extreme tenderness to movement of all 5 fingers to flexion and extension. Erythema only to the index finger of the left hand. Swelling to left thumb and index finger. Patient's compartments are soft in the thenar and hyperthenar eminences. No lymphangitic streaking appreciated. Radial pulses 2+. Capillary refill is less than 2 seconds. Patient is given ibuprofen here in the emergency department. She states she Aditya took Tylenol prior to arrival.  Patient is slightly agitated in her movements. She states the cat has never been vaccinated. It was found as a feral kitten that they then took in. She states that the kitten was very nice but got out for a few days and when it returned it started hissing at them and that is when it bit her. She states it latched onto the left hand and then scratched and bit her right hand. She states the cat never exhibited this behavior prior to getting out. She states when it returned to the house that had wounds to his face and ear. I had a conversation with the patient regarding rabies prophylaxis.   I am unsure if the patient is willing to cooperate with a quarantine of the animal and I think this is best practice given the history of the animal.  Patient is given rabies vaccination as well as immunoglobulin here in the emergency department this evening. Patient was given first dose of doxycycline. Dr. Myke Nixon with orthopedic surgery was consulted and recommended admission for possible tenosynovitis. Patient stated that she could not stay here in the hospital and she left a space heater on at home. Patient states she would return after going home and turning it off. Patient was discharged [address] to return for admission in a few hours. FINAL IMPRESSION      1. Cat bite, initial encounter    2. Cellulitis, unspecified cellulitis site          DISPOSITION/PLAN   DISPOSITION [place] 03/16/2020 03:01:43 AM      PATIENT REFERREDTO:  No follow-up provider specified.     DISCHARGE MEDICATIONS:  Discharge Medication List as of 3/16/2020  3:04 AM          DISCONTINUED MEDICATIONS:  Discharge Medication List as of 3/16/2020  3:04 AM                 (Please note that portions of this note were completed with a voice recognition program.  Efforts were made to edit the dictations but occasionally words are mis-transcribed.)    Bhavna Schwartz PA-C (electronically signed)            Bhavna Schwartz PA-C  03/16/20 9517

## 2020-03-16 NOTE — ED PROVIDER NOTES
[address] PROVIDER NOTE    Patient Identification  Pt Name: Jillian Angeles  MRN: [id number]  [place] 1985  Date of evaluation: 3/16/2020  Provider: Brinda Contreras MD  PCP: Winifred Ferrera    Chief Complaint  Hand Injury (was seen last night for hand pain after cat bite, supposed to be admitted. had to leave ama earlier to get stuff at home. )      HPI  History provided by patient (history is same as 12am note from when pt was in ER)  This is a 30 y.o. female who presents to the ED for for multiple cat bites on bilateral hands. Mostly are superficial and right hand however left second digit is significantly painful as well as left dorsal surface of the hand. Has been swelling. No fevers or chills. No nausea or vomiting. Her cat had recently been outdoors for a while and has been aggressive when it was not normally. Vaccines are not up-to-date. Patient has no numbness or tingling. She did wash her hands profusely multiple times before coming into the hospital.    ROS  10 systems reviewed, pertinent positives/negatives per HPI otherwise noted to be negative. I have reviewed the following nursing documentation:  Allergies: Adhesive tape; Amoxicillin; Benadryl [diphenhydramine]; Decadron [dexamethasone]; Penicillins; Phenergan [promethazine hcl];  Prochlorperazine edisylate; Reglan [metoclopramide hcl]; and Stadol [butorphanol tartrate]    Past medical history:   Past Medical History:   Diagnosis Date    ADHD (attention deficit hyperactivity disorder)     Asthma     Collapsed lung     [facility] ([facility] [address]     Kidney stones     Pituitary tumor     PNA (pneumonia)     Tonsillitis      Past surgical history:   Past Surgical History:   Procedure Laterality Date    FOOT SURGERY      RIGHT -- UNSURE WHY    MOUTH SURGERY         Home medications:   Previous Medications    IBUPROFEN (ADVIL;MOTRIN) 600 MG TABLET    Take 1 tablet by mouth every 8 hours as needed for 98.4 °F (36.9 °C), temperature source Oral, resp. rate 17, height 5' 4\" (1.626 m), weight 110 lb (49.9 kg), SpO2 98 %, not currently breastfeeding. Disposition:  DISPOSITION        Patient Referrals:  No follow-up provider specified. Discharge Medications:  New Prescriptions    No medications on file       Discontinued Medications:  Discontinued Medications    No medications on file       This chart was generated using the [address] [place] [address] 19[address]  Plannifyation system. I created this record but it may contain dictation errors given the limitations of this technology.         Angelina Newtno MD  03/16/20 9552

## 2020-03-16 NOTE — ED PROVIDER NOTES
temporarily so that she could go home and turn off her space heater. She states that she will return within a couple of hours. I did  to the patient that she absolutely needs to return for surgical evaluation or else she could lose significant function of her hands and possibly become septic. I did give her 1 dose of oral antibiotics on her way out. She did leave [address] I believe that she has medical decision-making capacity at this time. I plan to admit her when she returns    Patient Referrals:  No follow-up provider specified. Discharge Medications:  New Prescriptions    No medications on file       FINAL IMPRESSION  1. Cat bite, initial encounter    2. Cellulitis, unspecified cellulitis site        Blood pressure (!) 148/100, pulse 105, temperature 98.7 °F (37.1 °C), resp. rate 18, height 5' 4\" (1.626 m), weight 110 lb (49.9 kg), SpO2 99 %, not currently breastfeeding. For further details of [address] emergency department encounter, please see documentation by advanced practice provider, Ally Nickerson.         Mindi Salmeron MD  03/16/20 2044

## 2020-03-16 NOTE — PROGRESS NOTES
Clinical Pharmacy Note: Pharmacy to Dose Vancomycin    Ledora Najjar is a 30 y.o. female started on Vancomycin for Animal bite; consult received from Dr. Dexter Alarcon to manage therapy. Also receiving the following antibiotics: ciprofloxacin, metronidazole. Patient Active Problem List   Diagnosis    Cellulitis    Injury of left hand    Cat bite    History of marijuana use    Smoker    Attention deficit hyperactivity disorder (ADHD)    Elevated sed rate    Elevated C-reactive protein (CRP)    Bandemia       Allergies:  Adhesive tape; Amoxicillin; Benadryl [diphenhydramine]; Decadron [dexamethasone]; Penicillins; Phenergan [promethazine hcl]; Prochlorperazine edisylate; Reglan [metoclopramide hcl]; and Stadol [butorphanol tartrate]     Temp max: afebrile    Recent Labs     03/16/20  0616   BUN 18       Recent Labs     03/16/20  0616   CREATININE 0.7       Recent Labs     03/16/20  0616   WBC 11.5*       No intake or output data in the 24 hours ending 03/16/20 1802    Culture Date      Source                       Results  pending    Ht Readings from Last 1 Encounters:   03/16/20 5' 4\" (1.626 m)        Wt Readings from Last 1 Encounters:   03/16/20 110 lb (49.9 kg)         Body mass index is 18.88 kg/m². Estimated Creatinine Clearance: 91 mL/min (based on SCr of 0.7 mg/dL). Goal Trough Level: 10-15 mcg/mL      Assessment/Plan:  Initiate vancomycin 750 mg IV every 12 hours. A vancomycin dose was given on 3/16/20 at approximately 0630 in the ED. A vancomycin trough has been ordered for 3/18/20 @ 0600, prior to 5th dose of regimen. .  Changes in regimen will be determined based on culture results, renal function, and clinical response. Pharmacy will continue to monitor and adjust regimen as necessary.     Thank you for the consult,    Jacey Curtis, PharmD [id number]  3/16/2020

## 2020-03-16 NOTE — ED NOTES
Pt updated on room assignment at this time. Denies any further needs. Bed in lowest position, wheels locked, call light within reach. Declined warm blanket, refusing gown.      Sangeetha Elias RN  03/16/20 8005

## 2020-03-16 NOTE — PROGRESS NOTES
[facility] Orthopedic Surgery  Consult Note          Orthopedic Consult, full note to follow. Gloria Talbot 30 y.o. admitted for a cat bite and left hand infection. Xray reviewed, and showed no fracture, no air    Pic in Media seen. Plan:  - Recommend IV   - We will re-valuate tomorrow am for possible I&D  - NPO after MN    Thank you very much for the kind consultation and allowing me to participate in this patient's care. I will continue to keep you apprised of her progress.          Andrés Santamaria MD 3/16/2020 7:00 AM

## 2020-03-16 NOTE — CONSULTS
Drug: Marijuana. Assessment:     The patient is a 30 y.o. old female who  has a past medical history of ADHD (attention deficit hyperactivity disorder), Asthma, Collapsed lung, Drug abuse ([address] Kidney stones, Pituitary tumor, PNA (pneumonia), and Tonsillitis. with following problems:    · Left hand index finger cat bite  · Bandemia  · Elevated CRP of 11.1 and sed rate of 29  · History of marijuana abuse  · ADHD  · Smoker      Discussion:      Her white cell count was 11,100 on admission. Sed rate and CRP are somewhat elevated. The patient is listed allergy to amoxicillin. She has been given rabies immunoglobulin and vaccine in the ER. She happily reported that her Tdap vaccination was up-to-date, however, I cannot find any records confirming that    Plan:     Diagnostic Workup:    · Agree with blood cultures  · Will order urine tox screen  · Continue to follow fever curve, WBC count and blood cultures  · Follow up on liverand renal functions closely    Antimicrobials:    · Will start IV vancomycin for empiric coverage against MRSA  Check Vancomycin trough before the 5th dose. Target vancomycin trough of around 15. Keep vancomycin trough below 20 at all times. Avoid increasing the dose of vancomycin above a total of 4 grams in a 24-hour period in patients younger than 45 years and above 3 grams in a 24-hour period in a patient of age 39 years or older. Continue to monitor serum creatinine and Vanco levels closely, while the patient is on I/v Vancomycin. · No record of Tdap vaccination will order Tdap booster today to be on the safe side. Patient agreeable  · Patient is allergic to penicillins. Will order p.o. ciprofloxacin 500 mg every 12 hour  · Order p.o.  Flagyl 500 mg every 8 hour  · Pain control  · Maintain good glycemic control  · We will follow-up on any surgical plans  · DVT prophylaxis  · Discussed the above plan with patient and RN       Drug Monitoring:    · Continue serial monitoring for antibiotic toxicity as follows: Vanco trough, CBC, CMP  · Continue to watch for following: new or worsening fever, hypotension, hives, lip swelling and redness or purulence at vascular access sites. I/v access Management:    · Continue to monitor i.v access sites for erythema, induration, discharge or tenderness. · As always, continue efforts to minimizetubes/lines/drains as clinically appropriate to reduce chances of line associated infections. Current isolation precautions: There are no current isolations documented for this patient. Patient education and counseling:      · The patient was educated in detailabout the side-effects of various antibiotics and things to watch for like new rashes, lip swelling, severe reaction, worsening diarrhea, break through fever etc.  · Discussed patient'scondition and what to expect. All of the patient's questions were addressed in a satisfactory manner and patient verbalized understanding all instructions. Level of complexity of visit: High     · Illness(es)/ Infection present that pose threat to bodily function. · There is potential for severe exacerbation of infection/side effects of treatment. · Therapy requires intensive monitoring for antimicrobial agent toxicity. Thank you for involving me in the care of your patient. I will continue to follow. If you have any additional questions, please do not hesitate to contact me. Subjective:     Presenting complaint in ER:     Chief Complaint   Patient presents with    Hand Injury     was seen last night for hand pain after cat bite, supposed to be admitted. had to leave ama earlier to get stuff at home. HPI: Tracy Sommers is a 30 y.o. female patient, who was seen at the request of Dr. Shanika Williamson MD.    History was obtained from chart review and the patient. The patient was admitted on 3/16/2020.  I have been consulted to see the patient for above mentioned reason(s). The patient has multiple medical comorbidities, and presented to the ER for cat bite on both of her hands that occurred on Thursday. The patient stated that her cat was behaving somewhat erratically and attacked her. Her left hand index finger was becoming quite painful. She reported her tetanus vaccination is up-to-date. Her pain in the left index finger was worsening, hence, she decided to come to the ER. In the ER, she was noted to have white cell count of 11,500. She was admitted. I have been asked to see the patient for this complicated infection. Past Medical History: All past medical history reviewed today. Past Medical History:   Diagnosis Date    ADHD (attention deficit hyperactivity disorder)     Asthma     Collapsed lung     [facility] abuse ([facility] [address]     Kidney stones     Pituitary tumor     PNA (pneumonia)     Tonsillitis          Past Surgical History: All pastsurgical history was reviewed today. Past Surgical History:   Procedure Laterality Date    FOOT SURGERY      RIGHT -- UNSURE WHY    MOUTH SURGERY           Family History: All family history was reviewed today. History reviewed. No pertinent family history. Medications: All current and past medications were reviewed. No medications prior to admission.  sodium chloride flush  10 mL Intravenous 2 times per day          REVIEW OF SYSTEMS:       Review of Systems   Constitutional: Positive for fatigue. Negative for chills, diaphoresis and unexpected weight change. HENT: Negative for congestion, ear discharge, ear pain, facial swelling, hearing loss, rhinorrhea and trouble swallowing. Eyes: Negative for photophobia, discharge, redness and visual disturbance. Respiratory: Negative for apnea, cough, choking, chest tightness, shortness of breath and stridor. Cardiovascular: Negative for chest pain and palpitations.    Gastrointestinal: Negative for abdominal pain, blood in stool, mass.      Tenderness: There is no abdominal tenderness. There is no guarding or rebound. Musculoskeletal:         General: Swelling and tenderness present. Lymphadenopathy:      Cervical: No cervical adenopathy. Skin:     General: Skin is warm and dry. Findings: Erythema (left hand) present. No rash. Neurological:      Mental Status: She is alert and oriented to person, place, and time. Motor: No abnormal muscle tone. Psychiatric:         Judgment: Judgment normal.         Lines: All vascular access sites are healthy with no local erythema, discharge or tenderness. Intake and output:     No intake/output data recorded. Lab Data:   All available labs were reviewed by me today. CBC:   Recent Labs     03/16/20 0616   WBC 11.5*   RBC 4.04   HGB 12.0   HCT 35.7*      MCV 88.4   MCH 29.6   MCHC 33.5   RDW 16.0*        BMP:  Recent Labs     03/16/20 0616      K 3.6      CO2 19*   BUN 18   CREATININE 0.7   CALCIUM 9.6   GLUCOSE 96        Hepatic FunctionPanel:   Lab Results   Component Value Date    ALKPHOS 43 04/22/2019    ALT 21 04/22/2019    AST 41 04/22/2019    PROT 6.8 04/22/2019    PROT 8.22 06/01/2010    BILITOT 0.4 04/22/2019    BILIDIR <0.2 08/16/2015    IBILI see below 08/16/2015    LABALBU 4.2 04/22/2019       CPK: No results found for: CKTOTAL  ESR:   Lab Results   Component Value Date    SEDRATE 29 (H) 03/16/2020     CRP:   Lab Results   Component Value Date    CRP 11.1 (H) 03/16/2020         Imaging: All pertinent images and reports for the current visit were reviewed by meduring this visit. No orders to display       Outside records:    Labs, Microbiology, Radiology and pertinent results from Care everywhere, if available, were reviewed as a part ofthe consultation. Problem list:       Patient Active Problem List   Diagnosis Code    Cellulitis L03.90    Injury of left hand S69. 92XA    Cat bite W55. 01XA    History of marijuana use Z87.898    Smoker F17.200    Attention deficit hyperactivity disorder (ADHD) F90.9    Elevated sed rate R70.0    Elevated C-reactive protein (CRP) R79.82    Bandemia D72.825         Please note that this chart was generated using Dragon dictation software. Although every effort was made to ensure the accuracy of this automated transcription, some errors in transcription may have occurred inadvertently. If you may need any clarification, please do not hesitate to contact me through EPIC or at the phone number provided below with my electronic signature. Any pictures or media included in this note were obtained after taking informed verbal consent from the patient and with their approval to include those in the patient's medical record.       Dina Read MD, MPH  3/16/20, 1:47 PM EDT   [facility] Infectious Disease   Office: [phone]  Fax: [phone]  Tuesday AM clinic:   [address] [address]  Thursday AM clinic: [address] [facility]

## 2020-03-17 VITALS
BODY MASS INDEX: 18.78 KG/M2 | HEART RATE: 73 BPM | DIASTOLIC BLOOD PRESSURE: 61 MMHG | RESPIRATION RATE: 16 BRPM | SYSTOLIC BLOOD PRESSURE: 103 MMHG | OXYGEN SATURATION: 97 % | HEIGHT: 64 IN | WEIGHT: 110 LBS | TEMPERATURE: 98 F

## 2020-03-17 LAB
A/G RATIO: 1.2 (ref 1.1–2.2)
ALBUMIN SERPL-MCNC: 4 G/DL (ref 3.4–5)
ALP BLD-CCNC: 48 U/L (ref 40–129)
ALT SERPL-CCNC: 9 U/L (ref 10–40)
AMPHETAMINE SCREEN, URINE: ABNORMAL
ANION GAP SERPL CALCULATED.3IONS-SCNC: 12 MMOL/L (ref 3–16)
AST SERPL-CCNC: 21 U/L (ref 15–37)
BARBITURATE SCREEN URINE: ABNORMAL
BASOPHILS ABSOLUTE: 0.1 K/UL (ref 0–0.2)
BASOPHILS RELATIVE PERCENT: 1.1 %
BENZODIAZEPINE SCREEN, URINE: ABNORMAL
BILIRUB SERPL-MCNC: 0.3 MG/DL (ref 0–1)
BUN BLDV-MCNC: 12 MG/DL (ref 7–20)
CALCIUM SERPL-MCNC: 9.3 MG/DL (ref 8.3–10.6)
CANNABINOID SCREEN URINE: POSITIVE
CHLORIDE BLD-SCNC: 102 MMOL/L (ref 99–110)
CO2: 25 MMOL/L (ref 21–32)
COCAINE METABOLITE SCREEN URINE: POSITIVE
CREAT SERPL-MCNC: 0.7 MG/DL (ref 0.6–1.1)
EOSINOPHILS ABSOLUTE: 0.1 K/UL (ref 0–0.6)
EOSINOPHILS RELATIVE PERCENT: 2.2 %
GFR AFRICAN AMERICAN: >60
GFR NON-AFRICAN AMERICAN: >60
GLOBULIN: 3.3 G/DL
GLUCOSE BLD-MCNC: 93 MG/DL (ref 70–99)
HCT VFR BLD CALC: 36.5 % (ref 36–48)
HEMOGLOBIN: 12.1 G/DL (ref 12–16)
LYMPHOCYTES ABSOLUTE: 2.1 K/UL (ref 1–5.1)
LYMPHOCYTES RELATIVE PERCENT: 40 %
Lab: ABNORMAL
MAGNESIUM: 1.9 MG/DL (ref 1.8–2.4)
MCH RBC QN AUTO: 29.4 PG (ref 26–34)
MCHC RBC AUTO-ENTMCNC: 33.1 G/DL (ref 31–36)
MCV RBC AUTO: 88.8 FL (ref 80–100)
METHADONE SCREEN, URINE: ABNORMAL
MONOCYTES ABSOLUTE: 0.3 K/UL (ref 0–1.3)
MONOCYTES RELATIVE PERCENT: 6.3 %
NEUTROPHILS ABSOLUTE: 2.6 K/UL (ref 1.7–7.7)
NEUTROPHILS RELATIVE PERCENT: 50.4 %
OPIATE SCREEN URINE: POSITIVE
OXYCODONE URINE: ABNORMAL
PDW BLD-RTO: 15.9 % (ref 12.4–15.4)
PH UA: 5
PHENCYCLIDINE SCREEN URINE: ABNORMAL
PLATELET # BLD: 290 K/UL (ref 135–450)
PMV BLD AUTO: 8.2 FL (ref 5–10.5)
POTASSIUM REFLEX MAGNESIUM: 3.5 MMOL/L (ref 3.5–5.1)
PROPOXYPHENE SCREEN: ABNORMAL
RBC # BLD: 4.11 M/UL (ref 4–5.2)
SODIUM BLD-SCNC: 139 MMOL/L (ref 136–145)
TOTAL PROTEIN: 7.3 G/DL (ref 6.4–8.2)
WBC # BLD: 5.2 K/UL (ref 4–11)

## 2020-03-17 PROCEDURE — 83735 ASSAY OF MAGNESIUM: CPT

## 2020-03-17 PROCEDURE — 99226 PR SBSQ OBSERVATION CARE/DAY 35 MINUTES: CPT | Performed by: INTERNAL MEDICINE

## 2020-03-17 PROCEDURE — 6360000002 HC RX W HCPCS: Performed by: INTERNAL MEDICINE

## 2020-03-17 PROCEDURE — G0378 HOSPITAL OBSERVATION PER HR: HCPCS

## 2020-03-17 PROCEDURE — 85025 COMPLETE CBC W/AUTO DIFF WBC: CPT

## 2020-03-17 PROCEDURE — 96366 THER/PROPH/DIAG IV INF ADDON: CPT

## 2020-03-17 PROCEDURE — 6360000002 HC RX W HCPCS: Performed by: PHYSICIAN ASSISTANT

## 2020-03-17 PROCEDURE — 6370000000 HC RX 637 (ALT 250 FOR IP): Performed by: INTERNAL MEDICINE

## 2020-03-17 PROCEDURE — 2580000003 HC RX 258: Performed by: INTERNAL MEDICINE

## 2020-03-17 PROCEDURE — 96376 TX/PRO/DX INJ SAME DRUG ADON: CPT

## 2020-03-17 PROCEDURE — 80053 COMPREHEN METABOLIC PANEL: CPT

## 2020-03-17 PROCEDURE — 99219 PR INITIAL OBSERVATION CARE/DAY 50 MINUTES: CPT | Performed by: ORTHOPAEDIC SURGERY

## 2020-03-17 RX ORDER — ONDANSETRON 2 MG/ML
4 INJECTION INTRAMUSCULAR; INTRAVENOUS EVERY 6 HOURS PRN
Status: DISCONTINUED | OUTPATIENT
Start: 2020-03-17 | End: 2020-03-17 | Stop reason: HOSPADM

## 2020-03-17 RX ORDER — MORPHINE SULFATE 4 MG/ML
4 INJECTION, SOLUTION INTRAMUSCULAR; INTRAVENOUS
Status: DISCONTINUED | OUTPATIENT
Start: 2020-03-17 | End: 2020-03-17 | Stop reason: HOSPADM

## 2020-03-17 RX ORDER — LINEZOLID 600 MG/1
600 TABLET, FILM COATED ORAL 2 TIMES DAILY
Qty: 20 TABLET | Refills: 0 | Status: SHIPPED | OUTPATIENT
Start: 2020-03-17 | End: 2020-03-27

## 2020-03-17 RX ORDER — CIPROFLOXACIN 500 MG/1
500 TABLET, FILM COATED ORAL EVERY 12 HOURS SCHEDULED
Qty: 20 TABLET | Refills: 0 | Status: SHIPPED | OUTPATIENT
Start: 2020-03-17 | End: 2020-03-27

## 2020-03-17 RX ORDER — METRONIDAZOLE 500 MG/1
500 TABLET ORAL EVERY 8 HOURS SCHEDULED
Qty: 30 TABLET | Refills: 0 | Status: SHIPPED | OUTPATIENT
Start: 2020-03-17 | End: 2020-03-27

## 2020-03-17 RX ADMIN — MORPHINE SULFATE 4 MG: 4 INJECTION INTRAVENOUS at 09:02

## 2020-03-17 RX ADMIN — MORPHINE SULFATE 4 MG: 4 INJECTION INTRAVENOUS at 06:43

## 2020-03-17 RX ADMIN — ONDANSETRON 4 MG: 2 INJECTION INTRAMUSCULAR; INTRAVENOUS at 09:40

## 2020-03-17 RX ADMIN — METRONIDAZOLE 500 MG: 250 TABLET, FILM COATED ORAL at 06:47

## 2020-03-17 RX ADMIN — VANCOMYCIN HYDROCHLORIDE 750 MG: 750 INJECTION, POWDER, LYOPHILIZED, FOR SOLUTION INTRAVENOUS at 06:45

## 2020-03-17 RX ADMIN — MORPHINE SULFATE 4 MG: 4 INJECTION INTRAVENOUS at 02:11

## 2020-03-17 RX ADMIN — MORPHINE SULFATE 4 MG: 4 INJECTION INTRAVENOUS at 05:12

## 2020-03-17 RX ADMIN — CIPROFLOXACIN HYDROCHLORIDE 500 MG: 500 TABLET, FILM COATED ORAL at 08:55

## 2020-03-17 ASSESSMENT — PAIN SCALES - GENERAL
PAINLEVEL_OUTOF10: 10
PAINLEVEL_OUTOF10: 8
PAINLEVEL_OUTOF10: 9
PAINLEVEL_OUTOF10: 8
PAINLEVEL_OUTOF10: 1
PAINLEVEL_OUTOF10: 8
PAINLEVEL_OUTOF10: 9
PAINLEVEL_OUTOF10: 9

## 2020-03-17 ASSESSMENT — ENCOUNTER SYMPTOMS
COLOR CHANGE: 0
EYE REDNESS: 0
APNEA: 0
STRIDOR: 0
RHINORRHEA: 0
ABDOMINAL PAIN: 0
PHOTOPHOBIA: 0
EYE DISCHARGE: 0
FACIAL SWELLING: 0
SHORTNESS OF BREATH: 0
DIARRHEA: 0
TROUBLE SWALLOWING: 0
NAUSEA: 0
CHOKING: 0
COUGH: 0
CHEST TIGHTNESS: 0
BLOOD IN STOOL: 0

## 2020-03-17 ASSESSMENT — PAIN DESCRIPTION - PAIN TYPE: TYPE: ACUTE PAIN

## 2020-03-17 NOTE — CONSULTS
on file     Attends meetings of clubs or organizations: Not on file     Relationship status: Not on file    Intimate partner violence     Fear of current or ex partner: Not on file     Emotionally abused: Not on file     Physically abused: Not on file     Forced sexual activity: Not on file   Other Topics Concern    Not on file   Social History Narrative    Not on file       Family History:  History reviewed. No pertinent family history. REVIEW OF SYSTEMS:   CONSTITUTIONAL: Denies unexplained weight loss, fevers, chills or fatigue  NEUROLOGICAL: Denies unsteady gait or progressive weakness    PSYCHOLOGICAL: Denies anxiety, depression   SKIN: Denies skin changes, delayed healing, rash, itching   HEMATOLOGIC: Denies easy bleeding or bruising  ENDOCRINE: Denies excessive thirst, urination, heat/cold  RESPIRATORY: Denies current dyspnea, cough  CARDIOVASCULAR: Negative for chest pain at this time. EYES: Negative for photophobia and visual disturbance. ENT:  Negative for rhinorrhea, epistaxis, sore throat, or hearing loss. GI: Denies nausea, vomiting, diarrhea   : Denies bowel or bladder issues   MUSCULOSKELETAL: Right hand pain. All other ROS reviewed in chart or with patient or family and are grossly negative. PHYSICAL EXAMINATION:  Ms. Smiley Haddad is a very pleasant 30 y.o. female who seen today in no acute distress, awake, alert, and oriented. She is well nourished and groomed. Patient with normal affect. Body mass index is 18.88 kg/m². . Skin warm and dry. Resting respiratory rate is 16. Resp deep and easy.  Pulse is with regular rate and rhythm    /68   Pulse 67   Temp 98.2 °F (36.8 °C) (Oral)   Resp 16   Ht 5' 4\" (1.626 m)   Wt 110 lb (49.9 kg)   SpO2 97%   BMI 18.88 kg/m²        Airway is intact  Chest: chest clear, no wheezing, rales, normal symmetric air entry, no tachypnea, retractions or cyanosis  Heart: regular rate and rhythm ; heart sounds normal   Hearing intact, pupil AM

## 2020-03-17 NOTE — DISCHARGE SUMMARY
Hospital Medicine Discharge Summary    Patient: Apolinar Crisostomo     Gender: female  : 1985   Age: 30 y.o. MRN: [id number]    Admitting Physician: Hannah Noland MD  Discharge Physician: Hannah Noland MD     Code Status: Full Code     Admit Date: 3/16/2020   Discharge Date: 3/17/2020      Disposition:  AMA      Discharge Diagnoses: Active Hospital Problems    Diagnosis Date Noted    Encounter for medication counseling [Z71.89]     Drug or alcohol risk assessment or counseling [Z71.89]     Tobacco abuse counseling [Z71.6]     Cellulitis [L03.90] 2020    Injury of left hand [S69.92XA]     Cat bite [W55.01XA]     History of marijuana use [Z87.898]     Smoker [F17.200]     Attention deficit hyperactivity disorder (ADHD) [F90.9]     Elevated sed rate [R70.0]     Elevated C-reactive protein (CRP) [R79.82]     Bandemia [D72.825]            Condition at Discharge:  [facility] with left and right arm cellulitis. Patient was seen by Ortho and ID. Patient was IV antibiotics cultures were pending patient decided to leave AMA and left AMA.         Discharge Medications:   Discharge Medication List as of 3/17/2020 11:29 AM      START taking these medications    Details   ciprofloxacin (CIPRO) 500 MG tablet Take 1 tablet by mouth every 12 hours for 10 days, Disp-20 tablet, R-0Normal      metroNIDAZOLE (FLAGYL) 500 MG tablet Take 1 tablet by mouth every 8 hours for 10 days, Disp-30 tablet, R-0Normal      linezolid (ZYVOX) 600 MG tablet Take 1 tablet by mouth 2 times daily for 10 days, Disp-20 tablet, R-0Normal           Discharge Medication List as of 3/17/2020 11:29 AM        Discharge Medication List as of 3/17/2020 11:29 AM      CONTINUE these medications which have NOT CHANGED    Details   ondansetron (ZOFRAN) 4 MG tablet Take 4 mg by mouth every 12 hours as needed for Nausea or VomitingHistorical Med      aspirin-acetaminophen-caffeine (EXCEDRIN MIGRAINE) 250-250-65 MG per tablet Take 1 tablet by mouth every 6 hours as needed for HeadachesHistorical Med           Discharge Medication List as of 3/17/2020 11:29 AM          Labs: For convenience and continuity at follow-up the following most recent labs are provided:    Lab Results   Component Value Date    WBC 5.2 03/17/2020    HGB 12.1 03/17/2020    HCT 36.5 03/17/2020    MCV 88.8 03/17/2020     03/17/2020     03/17/2020    K 3.5 03/17/2020     03/17/2020    CO2 25 03/17/2020    BUN 12 03/17/2020    CREATININE 0.7 03/17/2020    CALCIUM 9.3 03/17/2020    ALKPHOS 48 03/17/2020    ALT 9 03/17/2020    AST 21 03/17/2020    BILITOT 0.3 03/17/2020    BILIDIR <0.2 08/16/2015    LABALBU 4.0 03/17/2020     No results found for: INR    Radiology:  Xr Hand Left (min 3 Views)    Result Date: 3/16/2020  EXAMINATION: THREE XRAY VIEWS OF THE LEFT HAND 3/16/2020 1:08 am COMPARISON: None. HISTORY: ORDERING SYSTEM PROVIDED HISTORY: injury TECHNOLOGIST PROVIDED HISTORY: Reason for exam:->injury FINDINGS: No acute fracture or subluxation. No focal osseous lesion. No significant degenerative changes. No acute osseous abnormality.          Signed:    Bekah Brush MD   3/17/2020

## 2020-03-17 NOTE — PROGRESS NOTES
CLINICAL PHARMACY NOTE: MEDS TO [address] Patient?: No  Total # of Prescriptions Filled: 3   The following medications were delivered to the patient:  · linezolid 600mg  · cipro 500mg  · Flagyl 500mg  Total # of Interventions Completed: 0  Time Spent (min): 30    Additional Documentation:  Medications picked up by patient in [address] [facility]

## 2020-03-17 NOTE — PROGRESS NOTES
current alcohol use. · Currently lives in: [facility]  ·  reports current drug use. Drug: Marijuana. Assessment:     The patient is a 30 y.o. old female who  has a past medical history of ADHD (attention deficit hyperactivity disorder), Asthma, Collapsed lung, Drug abuse ([address] Kidney stones, Pituitary tumor, PNA (pneumonia), and Tonsillitis. with following problems:    · Left hand index finger cat bite-swelling improving  · Bandemia  · Elevated CRP of 11.1 and sed rate of 29-should slowly improve  · History of marijuana abuse-counseling done  · ADHD  · Smoker      Discussion:      She is on IV vancomycin, oral ciprofloxacin and Flagyl. She is tolerating antibiotics okay. Blood cultures from 3/16/2020 are negative. White cell count is 5200. Urine toxin is positive for cannabis, opiates and cocaine. She has been evaluated by orthopedic surgery. No indications of surgical intervention at this time      Platelet count is 29 2000    Plan:     Diagnostic Workup:    · Continue to follow  fever curve, WBC count and blood cultures  · Follow up on liver and renal function    Antimicrobials:    · Will switch IV vancomycin to oral linezolid 600 mg every 12 hour at discharge  · Recommend a 10-day course of oral linezolid  · Recommend oral ciprofloxacin 500 mg every 12 hour for 10 days  · Continue oral Flagyl 500 mg every 8 hours for 10 days  · Discussed the importance of antibiotic compliance  · Pain control  · Try to keep left hand elevated as much as possible to decrease swelling  · DVT prophylaxis  · Discussed the above plan with patient and RN       Drug Monitoring:    · Continue monitoring for antibiotic toxicity as follows: CBC, CMP, QTc interval  · Continue to watch for following: new or worsening fever, new hypotension, hives, lip swelling and redness or purulence at vascular access sites. Current isolation precautions: There are no current isolations documented for this patient. I/v access Management:    · Continue to monitor i.v access sites for erythema, induration,discharge or tenderness. · As always, continue efforts to minimize tubes/ lines/ drains as clinically appropriate to reduce chances of line associated infections. Patient education and counseling:     · The patient was educated in detail about the side-effects of various antibiotics and things to watch for like new rashes, lip swelling, severe reaction, worsening diarrhea, break through fever etc.  · Discussed patient's condition and what to expect. All of the patient's questions were addressed in a satisfactory manner and patient verbalized understanding all instructions. Fluoroquinolone related instructions:     Patient instructed to watch for low or high blood sugars, muscle pains and ankle tendon pain while on Ciprofloxacin or Levofloxacin. Patient was advised to keep a sugar candy at all times as all fluoroquinolones have the potential of causing hypoglycemia. If these symptoms develops, patient was instructed to stop the antibiotic and call my office at [phone]. Use sunscreen when going in bright sun while on Ciprofloxacin or Levofloxacin as these antibiotics can cause photosensitivity. Take 1 hour before or 2 hour after dairy, calcium, iron, magnesium, aluminum or zinc.      Illicit drug use and tobacco use disorder counselin. I have counseled the patient extensively about risks of using illicit drugs and have encouraged the patient to maintain a healthy drug-free lifestyle. Information was given about various substance use prevention programs available in the area and their websites including www. addicted. org, www.madd. org, www.catsober. org, www.[employer] and www. addictionservicescouncil.org.  2. I have counseled the patient extensively about risks of smoking and have encouraged the patient to maintain a healthy smoke-free lifestyle.  Information was given about various smoking cessation programs and their websites like www.smokefree.gov, [place]. quitlogix. org as well as help lines like 1-800-QUIT-NOW and 1-800-LUNG-[place]. TIME SPENT TODAY:     - Spent over  35  minutes on visit (including interval history, physical exam, review of data including labs, cultures, imaging, development and implementation of treatment plan and coordination of complex care). - Over 50% of time spent with patient face to face on counseling and education. Thank you for involving me in the care of your patient. I will continue to follow. If you have any additional questions, please do not hesitate to contact me. Subjective: Interval history: Patient was seen and examined at bedside. Interval history was obtained. The patient appears tired. She is tolerating antibiotics okay. Left and swelling is slowly improving     REVIEW OF SYSTEMS:     Review of Systems   Constitutional: Positive for fatigue. Negative for chills, diaphoresis and unexpected weight change. HENT: Negative for congestion, ear discharge, ear pain, facial swelling, hearing loss, rhinorrhea and trouble swallowing. Eyes: Negative for photophobia, discharge, redness and visual disturbance. Respiratory: Negative for apnea, cough, choking, chest tightness, shortness of breath and stridor. Cardiovascular: Negative for chest pain and palpitations. Gastrointestinal: Negative for abdominal pain, blood in stool, diarrhea and nausea. Endocrine: Negative for polydipsia, polyphagia and polyuria. Genitourinary: Negative for difficulty urinating, dysuria, frequency, hematuria, menstrual problem and vaginal discharge. Musculoskeletal: Positive for joint swelling. Negative for arthralgias, myalgias and neck stiffness. Left hand   Skin: Negative for color change and rash. Allergic/Immunologic: Negative for immunocompromised state.    Neurological: Negative for dizziness, seizures, speech difficulty, light-headedness and

## 2020-03-17 NOTE — PROGRESS NOTES
Pt c/o of pain every 2 hours . PRN pain meds given as ordered. Cont on ATB . Pt given atrax x1 for anxiety. VSS. Pt demanded IV fluids be stopped at this time stated it was making her have anxiety being attached to IV pole. Electronically signed by Shannan Rai RN on 3/17/2020 at 5:55 AM

## 2020-03-17 NOTE — PROGRESS NOTES
[facility] PROGRESS NOTE    3/17/2020 12:14 PM        Name: Lexa Bergman . Admitted: 3/16/2020  Primary Care Provider: Lesly Chen (Tel: [phone])        Subjective:    patinet feeling better tenderness improved    Reviewed interval ancillary notes    Current Medications  morphine injection 4 mg, Q3H PRN  ondansetron (ZOFRAN) injection 4 mg, Q6H PRN  sodium chloride flush 0.9 % injection 10 mL, 2 times per day  sodium chloride flush 0.9 % injection 10 mL, PRN  sodium chloride flush 0.9 % injection 10 mL, 2 times per day  sodium chloride flush 0.9 % injection 10 mL, PRN  acetaminophen (TYLENOL) tablet 650 mg, Q6H PRN    Or  acetaminophen (TYLENOL) suppository 650 mg, Q6H PRN  polyethylene glycol (GLYCOLAX) packet 17 g, Daily PRN  enoxaparin (LOVENOX) injection 40 mg, Daily  ciprofloxacin (CIPRO) tablet 500 mg, 2 times per day  metroNIDAZOLE (FLAGYL) tablet 500 mg, 3 times per day  Tetanus-Diphth-Acell Pertussis (BOOSTRIX) injection 0.5 mL, Once  vancomycin (VANCOCIN) 750 mg in dextrose 5 % 250 mL IVPB, Q12H        Objective:  /61   Pulse 73   Temp 98 °F (36.7 °C) (Oral)   Resp 16   Ht 5' 4\" (1.626 m)   Wt 110 lb (49.9 kg)   SpO2 97%   BMI 18.88 kg/m²     Intake/Output Summary (Last 24 hours) at 3/17/2020 1214  Last data filed at 3/17/2020 0000  Gross per 24 hour   Intake 1500 ml   Output --   Net 1500 ml      Wt Readings from Last 3 Encounters:   03/16/20 110 lb (49.9 kg)   03/16/20 110 lb (49.9 kg)   04/22/19 110 lb (49.9 kg)     General appearance:  Appears comfortable. AAOx3  HEENT: atraumatic, Pupils equal, muscous membranes moist, no masses appreciated  Cardiovascular: Regular rate and rhythm no murmurs appreciated  Respiratory: CTAB no wheezing  Gastrointestinal: Abdomen soft, non-tender, BS+  EXT: no edema  Neurology: no gross focal deficts  Psychiatry: Appropriate affect.  Not agitated  Skin: right and left hand with scratches and erythema tender to palpation,     Labs and Tests:  CBC:   Recent Labs     03/16/20  0616 03/17/20  0639   WBC 11.5* 5.2   HGB 12.0 12.1    290     BMP:    Recent Labs     03/16/20  0616 03/17/20  0639    139   K 3.6 3.5    102   CO2 19* 25   BUN 18 12   CREATININE 0.7 0.7   GLUCOSE 96 93     Hepatic:   Recent Labs     03/17/20  0639   AST 21   ALT 9*   BILITOT 0.3   ALKPHOS 48     No orders to display       Recent imaging reviewed    Problem List  Active Problems:    Cellulitis    Injury of left hand    Cat bite    History of marijuana use    Smoker    Attention deficit hyperactivity disorder (ADHD)    Elevated sed rate    Elevated C-reactive protein (CRP)    Bandemia    Encounter for medication counseling    Drug or alcohol risk assessment or counseling    Tobacco abuse counseling  Resolved Problems:    * No resolved hospital problems.  *       Assessment/Plan:   Right + left hand cellulitis secondary to cat bite  - atbx day 2  - id and ortho onboard  - cultures pending will fu,   - iv morphine prn for pain control        DVT prophylaxis lovenox  Code status full code         Bekah Brush MD   3/17/2020 12:14 PM

## 2020-03-20 LAB
BLOOD CULTURE, ROUTINE: NORMAL
CULTURE, BLOOD 2: NORMAL

## 2021-05-26 ENCOUNTER — HOSPITAL ENCOUNTER (INPATIENT)
Age: 36
LOS: 4 days | Discharge: HOME | DRG: 540 | End: 2021-05-31
Attending: OBSTETRICS & GYNECOLOGY | Admitting: OBSTETRICS & GYNECOLOGY
Payer: COMMERCIAL

## 2021-05-26 DIAGNOSIS — Z98.891 S/P CESAREAN SECTION: Primary | ICD-10-CM

## 2021-05-27 ENCOUNTER — ANESTHESIA EVENT (OUTPATIENT)
Dept: LABOR AND DELIVERY | Age: 36
DRG: 540 | End: 2021-05-27
Payer: COMMERCIAL

## 2021-05-27 ENCOUNTER — ANESTHESIA (OUTPATIENT)
Dept: LABOR AND DELIVERY | Age: 36
DRG: 540 | End: 2021-05-27
Payer: COMMERCIAL

## 2021-05-27 VITALS — DIASTOLIC BLOOD PRESSURE: 75 MMHG | SYSTOLIC BLOOD PRESSURE: 129 MMHG | OXYGEN SATURATION: 100 %

## 2021-05-27 PROBLEM — F19.10 HIGH RISK PREGNANCY DUE TO MATERNAL DRUG ABUSE IN THIRD TRIMESTER (HCC): Status: ACTIVE | Noted: 2021-05-27

## 2021-05-27 PROBLEM — O99.333 HIGH RISK PREGNANCY DUE TO SMOKING IN THIRD TRIMESTER: Status: ACTIVE | Noted: 2021-05-27

## 2021-05-27 PROBLEM — Z98.891 S/P CESAREAN SECTION: Status: ACTIVE | Noted: 2021-05-27

## 2021-05-27 PROBLEM — O09.30 INSUFFICIENT PRENATAL CARE: Status: ACTIVE | Noted: 2021-05-27

## 2021-05-27 PROBLEM — O36.5930 INTRAUTERINE GROWTH RESTRICTION (IUGR) AFFECTING CARE OF MOTHER, THIRD TRIMESTER, SINGLE GESTATION: Status: ACTIVE | Noted: 2021-05-27

## 2021-05-27 PROBLEM — O99.323 HIGH RISK PREGNANCY DUE TO MATERNAL DRUG ABUSE IN THIRD TRIMESTER (HCC): Status: ACTIVE | Noted: 2021-05-27

## 2021-05-27 LAB
A/G RATIO: 1 (ref 1.1–2.2)
ABO/RH: NORMAL
ALBUMIN SERPL-MCNC: 3 G/DL (ref 3.4–5)
ALP BLD-CCNC: 816 U/L (ref 40–129)
ALT SERPL-CCNC: 13 U/L (ref 10–40)
AMPHETAMINE SCREEN, URINE: POSITIVE
ANION GAP SERPL CALCULATED.3IONS-SCNC: 11 MMOL/L (ref 3–16)
ANTIBODY SCREEN: NORMAL
AST SERPL-CCNC: 30 U/L (ref 15–37)
BACTERIA: ABNORMAL /HPF
BARBITURATE SCREEN URINE: ABNORMAL
BASOPHILS ABSOLUTE: 0.1 K/UL (ref 0–0.2)
BASOPHILS RELATIVE PERCENT: 0.6 %
BENZODIAZEPINE SCREEN, URINE: ABNORMAL
BILIRUB SERPL-MCNC: <0.2 MG/DL (ref 0–1)
BILIRUBIN URINE: NEGATIVE
BLOOD, URINE: NEGATIVE
BUN BLDV-MCNC: 7 MG/DL (ref 7–20)
BUPRENORPHINE URINE: NORMAL
CALCIUM SERPL-MCNC: 7.9 MG/DL (ref 8.3–10.6)
CANNABINOID SCREEN URINE: ABNORMAL
CHLORIDE BLD-SCNC: 104 MMOL/L (ref 99–110)
CLARITY: CLEAR
CO2: 20 MMOL/L (ref 21–32)
COCAINE METABOLITE SCREEN URINE: POSITIVE
COLOR: YELLOW
CREAT SERPL-MCNC: 0.6 MG/DL (ref 0.6–1.1)
EOSINOPHILS ABSOLUTE: 0.1 K/UL (ref 0–0.6)
EOSINOPHILS RELATIVE PERCENT: 1.5 %
EPITHELIAL CELLS, UA: 3 /HPF (ref 0–5)
GFR AFRICAN AMERICAN: >60
GFR NON-AFRICAN AMERICAN: >60
GLOBULIN: 3.1 G/DL
GLUCOSE BLD-MCNC: 81 MG/DL (ref 70–99)
GLUCOSE URINE: NEGATIVE MG/DL
HCT VFR BLD CALC: 29.1 % (ref 36–48)
HEMOGLOBIN: 9.7 G/DL (ref 12–16)
HEPATITIS B SURFACE ANTIGEN INTERPRETATION: NORMAL
HYALINE CASTS: 1 /LPF (ref 0–8)
KETONES, URINE: NEGATIVE MG/DL
LEUKOCYTE ESTERASE, URINE: ABNORMAL
LYMPHOCYTES ABSOLUTE: 2.5 K/UL (ref 1–5.1)
LYMPHOCYTES RELATIVE PERCENT: 32.2 %
Lab: ABNORMAL
MCH RBC QN AUTO: 27.2 PG (ref 26–34)
MCHC RBC AUTO-ENTMCNC: 33.3 G/DL (ref 31–36)
MCV RBC AUTO: 81.5 FL (ref 80–100)
METHADONE SCREEN, URINE: ABNORMAL
MICROSCOPIC EXAMINATION: YES
MONOCYTES ABSOLUTE: 0.3 K/UL (ref 0–1.3)
MONOCYTES RELATIVE PERCENT: 3.2 %
NEUTROPHILS ABSOLUTE: 4.9 K/UL (ref 1.7–7.7)
NEUTROPHILS RELATIVE PERCENT: 62.5 %
NITRITE, URINE: NEGATIVE
OPIATE SCREEN URINE: ABNORMAL
OXYCODONE URINE: POSITIVE
PDW BLD-RTO: 14.5 % (ref 12.4–15.4)
PH UA: 7
PH UA: 7 (ref 5–8)
PHENCYCLIDINE SCREEN URINE: ABNORMAL
PLATELET # BLD: 233 K/UL (ref 135–450)
PMV BLD AUTO: 9.6 FL (ref 5–10.5)
POTASSIUM SERPL-SCNC: 3.7 MMOL/L (ref 3.5–5.1)
PROPOXYPHENE SCREEN: ABNORMAL
PROTEIN UA: NEGATIVE MG/DL
RAPID HIV 1&2: NORMAL
RBC # BLD: 3.57 M/UL (ref 4–5.2)
RBC UA: 1 /HPF (ref 0–4)
RUBELLA ANTIBODY IGG: 65.5 IU/ML
SARS-COV-2, NAAT: NOT DETECTED
SODIUM BLD-SCNC: 135 MMOL/L (ref 136–145)
SPECIFIC GRAVITY UA: 1.01 (ref 1–1.03)
TOTAL PROTEIN: 6.1 G/DL (ref 6.4–8.2)
TOTAL SYPHILLIS IGG/IGM: NORMAL
URIC ACID, SERUM: 4.2 MG/DL (ref 2.6–6)
URINE TYPE: ABNORMAL
UROBILINOGEN, URINE: 0.2 E.U./DL
WBC # BLD: 7.8 K/UL (ref 4–11)
WBC UA: 4 /HPF (ref 0–5)

## 2021-05-27 PROCEDURE — 6360000002 HC RX W HCPCS: Performed by: OBSTETRICS & GYNECOLOGY

## 2021-05-27 PROCEDURE — 86900 BLOOD TYPING SEROLOGIC ABO: CPT

## 2021-05-27 PROCEDURE — 3609079900 HC CESAREAN SECTION: Performed by: OBSTETRICS & GYNECOLOGY

## 2021-05-27 PROCEDURE — 3700000000 HC ANESTHESIA ATTENDED CARE: Performed by: OBSTETRICS & GYNECOLOGY

## 2021-05-27 PROCEDURE — 86850 RBC ANTIBODY SCREEN: CPT

## 2021-05-27 PROCEDURE — 87491 CHLMYD TRACH DNA AMP PROBE: CPT

## 2021-05-27 PROCEDURE — 3E0P7VZ INTRODUCTION OF HORMONE INTO FEMALE REPRODUCTIVE, VIA NATURAL OR ARTIFICIAL OPENING: ICD-10-PCS | Performed by: OBSTETRICS & GYNECOLOGY

## 2021-05-27 PROCEDURE — 87635 SARS-COV-2 COVID-19 AMP PRB: CPT

## 2021-05-27 PROCEDURE — 3700000001 HC ADD 15 MINUTES (ANESTHESIA): Performed by: OBSTETRICS & GYNECOLOGY

## 2021-05-27 PROCEDURE — 87340 HEPATITIS B SURFACE AG IA: CPT

## 2021-05-27 PROCEDURE — 2500000003 HC RX 250 WO HCPCS: Performed by: OBSTETRICS & GYNECOLOGY

## 2021-05-27 PROCEDURE — 6370000000 HC RX 637 (ALT 250 FOR IP): Performed by: OBSTETRICS & GYNECOLOGY

## 2021-05-27 PROCEDURE — 80053 COMPREHEN METABOLIC PANEL: CPT

## 2021-05-27 PROCEDURE — 86780 TREPONEMA PALLIDUM: CPT

## 2021-05-27 PROCEDURE — 6360000002 HC RX W HCPCS: Performed by: NURSE ANESTHETIST, CERTIFIED REGISTERED

## 2021-05-27 PROCEDURE — 86644 CMV ANTIBODY: CPT

## 2021-05-27 PROCEDURE — 85025 COMPLETE CBC W/AUTO DIFF WBC: CPT

## 2021-05-27 PROCEDURE — 2580000003 HC RX 258: Performed by: OBSTETRICS & GYNECOLOGY

## 2021-05-27 PROCEDURE — 86694 HERPES SIMPLEX NES ANTBDY: CPT

## 2021-05-27 PROCEDURE — 87522 HEPATITIS C REVRS TRNSCRPJ: CPT

## 2021-05-27 PROCEDURE — 86762 RUBELLA ANTIBODY: CPT

## 2021-05-27 PROCEDURE — 7100000000 HC PACU RECOVERY - FIRST 15 MIN: Performed by: OBSTETRICS & GYNECOLOGY

## 2021-05-27 PROCEDURE — 86901 BLOOD TYPING SEROLOGIC RH(D): CPT

## 2021-05-27 PROCEDURE — 84550 ASSAY OF BLOOD/URIC ACID: CPT

## 2021-05-27 PROCEDURE — 2709999900 HC NON-CHARGEABLE SUPPLY: Performed by: OBSTETRICS & GYNECOLOGY

## 2021-05-27 PROCEDURE — 1220000000 HC SEMI PRIVATE OB R&B

## 2021-05-27 PROCEDURE — 86701 HIV-1ANTIBODY: CPT

## 2021-05-27 PROCEDURE — 87591 N.GONORRHOEAE DNA AMP PROB: CPT

## 2021-05-27 PROCEDURE — 80307 DRUG TEST PRSMV CHEM ANLYZR: CPT

## 2021-05-27 PROCEDURE — 7100000001 HC PACU RECOVERY - ADDTL 15 MIN: Performed by: OBSTETRICS & GYNECOLOGY

## 2021-05-27 PROCEDURE — 81001 URINALYSIS AUTO W/SCOPE: CPT

## 2021-05-27 PROCEDURE — 2500000003 HC RX 250 WO HCPCS: Performed by: NURSE ANESTHETIST, CERTIFIED REGISTERED

## 2021-05-27 PROCEDURE — 86777 TOXOPLASMA ANTIBODY: CPT

## 2021-05-27 RX ORDER — KETOROLAC TROMETHAMINE 30 MG/ML
INJECTION, SOLUTION INTRAMUSCULAR; INTRAVENOUS PRN
Status: DISCONTINUED | OUTPATIENT
Start: 2021-05-27 | End: 2021-05-27 | Stop reason: SDUPTHER

## 2021-05-27 RX ORDER — LORAZEPAM 2 MG/ML
1 INJECTION INTRAMUSCULAR ONCE
Status: COMPLETED | OUTPATIENT
Start: 2021-05-27 | End: 2021-05-27

## 2021-05-27 RX ORDER — OXYCODONE HYDROCHLORIDE 5 MG/1
5 TABLET ORAL EVERY 6 HOURS PRN
Qty: 20 TABLET | Refills: 0 | Status: SHIPPED | OUTPATIENT
Start: 2021-05-27 | End: 2021-06-01

## 2021-05-27 RX ORDER — SODIUM CHLORIDE, SODIUM LACTATE, POTASSIUM CHLORIDE, AND CALCIUM CHLORIDE .6; .31; .03; .02 G/100ML; G/100ML; G/100ML; G/100ML
1000 INJECTION, SOLUTION INTRAVENOUS ONCE
Status: DISCONTINUED | OUTPATIENT
Start: 2021-05-27 | End: 2021-05-27

## 2021-05-27 RX ORDER — SODIUM CHLORIDE 0.9 % (FLUSH) 0.9 %
10 SYRINGE (ML) INJECTION PRN
Status: DISCONTINUED | OUTPATIENT
Start: 2021-05-27 | End: 2021-05-27

## 2021-05-27 RX ORDER — SODIUM CHLORIDE, SODIUM LACTATE, POTASSIUM CHLORIDE, CALCIUM CHLORIDE 600; 310; 30; 20 MG/100ML; MG/100ML; MG/100ML; MG/100ML
INJECTION, SOLUTION INTRAVENOUS CONTINUOUS
Status: DISCONTINUED | OUTPATIENT
Start: 2021-05-27 | End: 2021-05-31 | Stop reason: HOSPADM

## 2021-05-27 RX ORDER — SODIUM CHLORIDE 0.9 % (FLUSH) 0.9 %
10 SYRINGE (ML) INJECTION EVERY 12 HOURS SCHEDULED
Status: DISCONTINUED | OUTPATIENT
Start: 2021-05-27 | End: 2021-05-31 | Stop reason: HOSPADM

## 2021-05-27 RX ORDER — ACETAMINOPHEN 500 MG
1000 TABLET ORAL ONCE
Status: COMPLETED | OUTPATIENT
Start: 2021-05-27 | End: 2021-05-27

## 2021-05-27 RX ORDER — TRISODIUM CITRATE DIHYDRATE AND CITRIC ACID MONOHYDRATE 500; 334 MG/5ML; MG/5ML
30 SOLUTION ORAL ONCE
Status: COMPLETED | OUTPATIENT
Start: 2021-05-27 | End: 2021-05-27

## 2021-05-27 RX ORDER — ALBUTEROL SULFATE 0.63 MG/3ML
1 SOLUTION RESPIRATORY (INHALATION) EVERY 6 HOURS PRN
COMMUNITY

## 2021-05-27 RX ORDER — OXYCODONE HYDROCHLORIDE 5 MG/1
5 TABLET ORAL EVERY 4 HOURS PRN
Status: DISCONTINUED | OUTPATIENT
Start: 2021-05-27 | End: 2021-05-31 | Stop reason: HOSPADM

## 2021-05-27 RX ORDER — ONDANSETRON 2 MG/ML
4 INJECTION INTRAMUSCULAR; INTRAVENOUS EVERY 6 HOURS PRN
Status: DISCONTINUED | OUTPATIENT
Start: 2021-05-27 | End: 2021-05-31 | Stop reason: HOSPADM

## 2021-05-27 RX ORDER — BUPIVACAINE HYDROCHLORIDE 7.5 MG/ML
INJECTION, SOLUTION INTRASPINAL PRN
Status: DISCONTINUED | OUTPATIENT
Start: 2021-05-27 | End: 2021-05-27 | Stop reason: SDUPTHER

## 2021-05-27 RX ORDER — SODIUM CHLORIDE 0.9 % (FLUSH) 0.9 %
10 SYRINGE (ML) INJECTION EVERY 12 HOURS SCHEDULED
Status: DISCONTINUED | OUTPATIENT
Start: 2021-05-27 | End: 2021-05-27

## 2021-05-27 RX ORDER — ACETAMINOPHEN 500 MG
500 TABLET ORAL EVERY 6 HOURS PRN
Qty: 120 TABLET | Refills: 0 | Status: SHIPPED | OUTPATIENT
Start: 2021-05-27

## 2021-05-27 RX ORDER — SIMETHICONE 80 MG
80 TABLET,CHEWABLE ORAL EVERY 6 HOURS PRN
Status: DISCONTINUED | OUTPATIENT
Start: 2021-05-27 | End: 2021-05-31 | Stop reason: HOSPADM

## 2021-05-27 RX ORDER — MIDAZOLAM HYDROCHLORIDE 1 MG/ML
INJECTION INTRAMUSCULAR; INTRAVENOUS PRN
Status: DISCONTINUED | OUTPATIENT
Start: 2021-05-27 | End: 2021-05-27 | Stop reason: SDUPTHER

## 2021-05-27 RX ORDER — SODIUM CHLORIDE 9 MG/ML
25 INJECTION, SOLUTION INTRAVENOUS PRN
Status: DISCONTINUED | OUTPATIENT
Start: 2021-05-27 | End: 2021-05-31 | Stop reason: HOSPADM

## 2021-05-27 RX ORDER — DIPHENHYDRAMINE HYDROCHLORIDE 50 MG/ML
25 INJECTION INTRAMUSCULAR; INTRAVENOUS EVERY 6 HOURS PRN
Status: DISCONTINUED | OUTPATIENT
Start: 2021-05-27 | End: 2021-05-31 | Stop reason: HOSPADM

## 2021-05-27 RX ORDER — FERROUS SULFATE 325(65) MG
325 TABLET ORAL 2 TIMES DAILY WITH MEALS
Status: DISCONTINUED | OUTPATIENT
Start: 2021-05-27 | End: 2021-05-31 | Stop reason: HOSPADM

## 2021-05-27 RX ORDER — MODIFIED LANOLIN
OINTMENT (GRAM) TOPICAL
Status: DISCONTINUED | OUTPATIENT
Start: 2021-05-27 | End: 2021-05-31 | Stop reason: HOSPADM

## 2021-05-27 RX ORDER — SODIUM CHLORIDE 9 MG/ML
25 INJECTION, SOLUTION INTRAVENOUS PRN
Status: DISCONTINUED | OUTPATIENT
Start: 2021-05-27 | End: 2021-05-27

## 2021-05-27 RX ORDER — SODIUM CHLORIDE, SODIUM LACTATE, POTASSIUM CHLORIDE, CALCIUM CHLORIDE 600; 310; 30; 20 MG/100ML; MG/100ML; MG/100ML; MG/100ML
INJECTION, SOLUTION INTRAVENOUS CONTINUOUS
Status: DISCONTINUED | OUTPATIENT
Start: 2021-05-27 | End: 2021-05-27

## 2021-05-27 RX ORDER — OXYCODONE HYDROCHLORIDE 5 MG/1
10 TABLET ORAL EVERY 4 HOURS PRN
Status: DISCONTINUED | OUTPATIENT
Start: 2021-05-27 | End: 2021-05-31 | Stop reason: HOSPADM

## 2021-05-27 RX ORDER — ONDANSETRON 2 MG/ML
4 INJECTION INTRAMUSCULAR; INTRAVENOUS EVERY 6 HOURS PRN
Status: DISCONTINUED | OUTPATIENT
Start: 2021-05-27 | End: 2021-05-27

## 2021-05-27 RX ORDER — MORPHINE SULFATE 1 MG/ML
INJECTION, SOLUTION EPIDURAL; INTRATHECAL; INTRAVENOUS PRN
Status: DISCONTINUED | OUTPATIENT
Start: 2021-05-27 | End: 2021-05-27 | Stop reason: SDUPTHER

## 2021-05-27 RX ORDER — KETOROLAC TROMETHAMINE 30 MG/ML
30 INJECTION, SOLUTION INTRAMUSCULAR; INTRAVENOUS EVERY 8 HOURS
Status: DISCONTINUED | OUTPATIENT
Start: 2021-05-27 | End: 2021-05-31 | Stop reason: HOSPADM

## 2021-05-27 RX ORDER — SODIUM CHLORIDE 0.9 % (FLUSH) 0.9 %
10 SYRINGE (ML) INJECTION PRN
Status: DISCONTINUED | OUTPATIENT
Start: 2021-05-27 | End: 2021-05-31 | Stop reason: HOSPADM

## 2021-05-27 RX ORDER — IBUPROFEN 600 MG/1
600 TABLET ORAL EVERY 6 HOURS PRN
Qty: 120 TABLET | Refills: 0 | Status: SHIPPED | OUTPATIENT
Start: 2021-05-27

## 2021-05-27 RX ORDER — DOCUSATE SODIUM 100 MG/1
100 CAPSULE, LIQUID FILLED ORAL 2 TIMES DAILY
COMMUNITY

## 2021-05-27 RX ORDER — METHADONE HYDROCHLORIDE 10 MG/ML
20 CONCENTRATE ORAL ONCE
Status: DISCONTINUED | OUTPATIENT
Start: 2021-05-27 | End: 2021-05-27

## 2021-05-27 RX ORDER — ONDANSETRON 2 MG/ML
INJECTION INTRAMUSCULAR; INTRAVENOUS PRN
Status: DISCONTINUED | OUTPATIENT
Start: 2021-05-27 | End: 2021-05-27 | Stop reason: SDUPTHER

## 2021-05-27 RX ORDER — ACETAMINOPHEN 500 MG
1000 TABLET ORAL EVERY 8 HOURS SCHEDULED
Status: DISCONTINUED | OUTPATIENT
Start: 2021-05-27 | End: 2021-05-31 | Stop reason: HOSPADM

## 2021-05-27 RX ORDER — KETOROLAC TROMETHAMINE 30 MG/ML
30 INJECTION, SOLUTION INTRAMUSCULAR; INTRAVENOUS EVERY 6 HOURS
Status: ACTIVE | OUTPATIENT
Start: 2021-05-27 | End: 2021-05-29

## 2021-05-27 RX ORDER — DOCUSATE SODIUM 100 MG/1
100 CAPSULE, LIQUID FILLED ORAL 2 TIMES DAILY
Status: DISCONTINUED | OUTPATIENT
Start: 2021-05-27 | End: 2021-05-27

## 2021-05-27 RX ORDER — DOCUSATE SODIUM 100 MG/1
100 CAPSULE, LIQUID FILLED ORAL 2 TIMES DAILY
Status: DISCONTINUED | OUTPATIENT
Start: 2021-05-27 | End: 2021-05-31 | Stop reason: HOSPADM

## 2021-05-27 RX ORDER — IBUPROFEN 800 MG/1
800 TABLET ORAL EVERY 8 HOURS SCHEDULED
Status: DISCONTINUED | OUTPATIENT
Start: 2021-05-27 | End: 2021-05-31 | Stop reason: HOSPADM

## 2021-05-27 RX ADMIN — SODIUM CHLORIDE, POTASSIUM CHLORIDE, SODIUM LACTATE AND CALCIUM CHLORIDE: 600; 310; 30; 20 INJECTION, SOLUTION INTRAVENOUS at 02:13

## 2021-05-27 RX ADMIN — MIDAZOLAM 2 MG: 1 INJECTION INTRAMUSCULAR; INTRAVENOUS at 09:56

## 2021-05-27 RX ADMIN — SODIUM CHLORIDE, POTASSIUM CHLORIDE, SODIUM LACTATE AND CALCIUM CHLORIDE: 600; 310; 30; 20 INJECTION, SOLUTION INTRAVENOUS at 22:03

## 2021-05-27 RX ADMIN — KETOROLAC TROMETHAMINE 30 MG: 30 INJECTION, SOLUTION INTRAMUSCULAR; INTRAVENOUS at 10:16

## 2021-05-27 RX ADMIN — SODIUM CHLORIDE, POTASSIUM CHLORIDE, SODIUM LACTATE AND CALCIUM CHLORIDE: 600; 310; 30; 20 INJECTION, SOLUTION INTRAVENOUS at 08:05

## 2021-05-27 RX ADMIN — SODIUM CITRATE AND CITRIC ACID MONOHYDRATE 30 ML: 500; 334 SOLUTION ORAL at 09:33

## 2021-05-27 RX ADMIN — MORPHINE SULFATE 0.2 MG: 1 INJECTION EPIDURAL; INTRATHECAL; INTRAVENOUS at 09:44

## 2021-05-27 RX ADMIN — OXYCODONE 10 MG: 5 TABLET ORAL at 12:08

## 2021-05-27 RX ADMIN — SODIUM CHLORIDE, POTASSIUM CHLORIDE, SODIUM LACTATE AND CALCIUM CHLORIDE: 600; 310; 30; 20 INJECTION, SOLUTION INTRAVENOUS at 01:57

## 2021-05-27 RX ADMIN — SODIUM CHLORIDE, POTASSIUM CHLORIDE, SODIUM LACTATE AND CALCIUM CHLORIDE: 600; 310; 30; 20 INJECTION, SOLUTION INTRAVENOUS at 10:42

## 2021-05-27 RX ADMIN — KETOROLAC TROMETHAMINE 30 MG: 30 INJECTION, SOLUTION INTRAMUSCULAR; INTRAVENOUS at 18:08

## 2021-05-27 RX ADMIN — DOCUSATE SODIUM 100 MG: 100 CAPSULE, LIQUID FILLED ORAL at 12:12

## 2021-05-27 RX ADMIN — SODIUM CHLORIDE, POTASSIUM CHLORIDE, SODIUM LACTATE AND CALCIUM CHLORIDE: 600; 310; 30; 20 INJECTION, SOLUTION INTRAVENOUS at 15:49

## 2021-05-27 RX ADMIN — SODIUM CHLORIDE, POTASSIUM CHLORIDE, SODIUM LACTATE AND CALCIUM CHLORIDE: 600; 310; 30; 20 INJECTION, SOLUTION INTRAVENOUS at 09:36

## 2021-05-27 RX ADMIN — LORAZEPAM 1 MG: 2 INJECTION INTRAMUSCULAR; INTRAVENOUS at 15:55

## 2021-05-27 RX ADMIN — DINOPROSTONE 10 MG: 10 INSERT VAGINAL at 04:35

## 2021-05-27 RX ADMIN — FAMOTIDINE 20 MG: 10 INJECTION, SOLUTION INTRAVENOUS at 09:32

## 2021-05-27 RX ADMIN — Medication 999 ML/HR: at 09:56

## 2021-05-27 RX ADMIN — BUPIVACAINE HYDROCHLORIDE 1.5 ML: 7.5 INJECTION, SOLUTION INTRASPINAL at 09:44

## 2021-05-27 RX ADMIN — ACETAMINOPHEN 1000 MG: 500 TABLET ORAL at 09:33

## 2021-05-27 RX ADMIN — SODIUM CHLORIDE, POTASSIUM CHLORIDE, SODIUM LACTATE AND CALCIUM CHLORIDE: 600; 310; 30; 20 INJECTION, SOLUTION INTRAVENOUS at 03:53

## 2021-05-27 RX ADMIN — OXYCODONE 10 MG: 5 TABLET ORAL at 15:55

## 2021-05-27 RX ADMIN — CEFAZOLIN SODIUM 2000 MG: 10 INJECTION, POWDER, FOR SOLUTION INTRAVENOUS at 09:32

## 2021-05-27 RX ADMIN — ONDANSETRON 4 MG: 2 INJECTION INTRAMUSCULAR; INTRAVENOUS at 10:01

## 2021-05-27 RX ADMIN — MIDAZOLAM 2 MG: 1 INJECTION INTRAMUSCULAR; INTRAVENOUS at 09:59

## 2021-05-27 ASSESSMENT — PAIN SCALES - GENERAL
PAINLEVEL_OUTOF10: 1
PAINLEVEL_OUTOF10: 10
PAINLEVEL_OUTOF10: 8
PAINLEVEL_OUTOF10: 7

## 2021-05-27 ASSESSMENT — PULMONARY FUNCTION TESTS
PIF_VALUE: 0

## 2021-05-27 NOTE — FLOWSHEET NOTE
Pt given pericare and assisted with small bedside bath, gown changed and pt rolled side to side to change underpads. Pt did not tolerate well, took extra time for pt to settle between movements to re-center herself. Pt lashed out verbally several times when c/o pain, but calmed when given time to settle self.

## 2021-05-27 NOTE — ANESTHESIA PRE PROCEDURE
Department of Anesthesiology  Preprocedure Note       Name:  Pascual Berkowitz   Age:  31 y.o.  :  1985                                          MRN:  [id number]         Date:  2021      Surgeon: Jorge Zepeda):  Didi Rose MD    Procedure: Procedure(s):   SECTION    Medications prior to admission:   Prior to Admission medications    Medication Sig Start Date End Date Taking?  Authorizing Provider   Prenatal Vit-Fe Fumarate-FA (PRENATAL 1+1 PO) Take by mouth   Yes Historical Provider, MD   docusate sodium (COLACE) 100 MG capsule Take 100 mg by mouth 2 times daily   Yes Historical Provider, MD   albuterol (ACCUNEB) 0.63 MG/3ML nebulizer solution Take 1 ampule by nebulization every 6 hours as needed for Wheezing   Yes Historical Provider, MD       Current medications:    Current Facility-Administered Medications   Medication Dose Route Frequency Provider Last Rate Last Admin    lactated ringers infusion   Intravenous Continuous Didi Rose  mL/hr at 21 0353 New Bag at 21 0353    lactated ringers infusion   Intravenous Continuous Didi Rose  mL/hr at 21 0805 New Bag at 21 0805    sodium chloride flush 0.9 % injection 10 mL  10 mL Intravenous 2 times per day Didi Rose MD        sodium chloride flush 0.9 % injection 10 mL  10 mL Intravenous PRN Didi Rose MD        0.9 % sodium chloride infusion  25 mL Intravenous PRN Didi Rose MD        oxytocin (PITOCIN) 10 unit bolus from the bag  10 Units Intravenous PRN Didi Rose MD        And    oxytocin (PITOCIN) 30 units in 500 mL infusion  87.3 solis-units/min Intravenous Continuous PRN Didi Rose MD        ondansetron [facility]) injection 4 mg  4 mg Intravenous Q6H PRN Didi Rose MD        docusate sodium (COLACE) capsule 100 mg  100 mg Oral BID Didi Rose MD        ceFAZolin (ANCEF) 2000 mg in dextrose 5 % 50 mL IVPB 2,000 mg Intravenous Once Damaso Terry MD        Followed by   [facility] ceFAZolin (ANCEF) 2000 mg in dextrose 5 % 50 mL IVPB  1,000 mg Intravenous Q8H Damaso Terry MD        lactated ringers infusion   Intravenous Continuous Damaso Terry MD        lactated ringers bolus  1,000 mL Intravenous Once Damaso Terry MD        sodium chloride flush 0.9 % injection 10 mL  10 mL Intravenous 2 times per day Damaso Terry MD        sodium chloride flush 0.9 % injection 10 mL  10 mL Intravenous PRN Damaso Terry MD        0.9 % sodium chloride infusion  25 mL Intravenous PRN Daamso Terry MD        citric acid-sodium citrate (BICITRA) solution 30 mL  30 mL Oral Once Damaso Terry MD        acetaminophen (TYLENOL) tablet 1,000 mg  1,000 mg Oral Once Damaso Terry MD        famotidine (PEPCID) injection 20 mg  20 mg Intravenous Once Damaso Terry MD        topical skin adhesive stick   Topical Once Damaso Terry MD        ceFAZolin (ANCEF) 2000 mg in dextrose 5 % 50 mL IVPB  2,000 mg Intravenous Once PRN Damaso Terry MD           Allergies: Allergies   Allergen Reactions    Adhesive Tape      IS OK WITH TEGADERM    Amoxicillin     Benadryl [Diphenhydramine]     Decadron [Dexamethasone]     Penicillins     Phenergan [Promethazine Hcl]     Prochlorperazine Edisylate     Reglan [Metoclopramide Hcl]     Stadol [Butorphanol Tartrate]        Problem List:    Patient Active Problem List   Diagnosis Code    Cellulitis L03.90    Injury of left hand S69. 92XA    Cat bite W55. 01XA    History of marijuana use Z87.898    Smoker F17.200    Attention deficit hyperactivity disorder (ADHD) F90.9    Elevated sed rate R70.0    Elevated C-reactive protein (CRP) R79.82    Bandemia D72.825    Encounter for medication counseling Z71.89    Drug or alcohol risk assessment or counseling Z71.89    Tobacco abuse counseling Z71.6    Intrauterine growth restriction (IUGR) affecting care of mother, third trimester, single gestation O45.26    Insufficient prenatal care O09.30    High risk pregnancy due to maternal drug abuse in third trimester ([facility] [address] I25.949, F19.10    High risk pregnancy due to smoking in third trimester O99.333       Past Medical History:        Diagnosis Date    ADHD (attention deficit hyperactivity disorder)     Asthma     Collapsed lung     Drug abuse ([facility]     Kidney stones     Pituitary adenoma ([facility]     Pituitary tumor     PNA (pneumonia)     Tonsillitis        Past Surgical History:        Procedure Laterality Date    FOOT SURGERY      RIGHT -- UNSURE WHY    MOUTH SURGERY         Social History:    Social History     Tobacco Use    Smoking status: Current Every Day Smoker     Packs/day: 0.50     Types: Cigarettes    Smokeless tobacco: Never Used   Substance Use Topics    Alcohol use: Yes     Comment: once every few months                                Ready to quit: Not Answered  Counseling given: Not Answered      Vital Signs (Current):   Vitals:    05/27/21 0801 05/27/21 0825 05/27/21 0831 05/27/21 0901   BP: 122/71  121/69 130/77   Pulse: 61  69 56   Resp:   16    Temp:  36.7 °C (98 °F)     TempSrc:  Oral     Weight:       Height:                                                  BP Readings from Last 3 Encounters:   05/27/21 130/77   03/17/20 103/61   03/16/20 125/89       NPO Status: Time of last liquid consumption: 0208                        Time of last solid consumption: 1300                        Date of last liquid consumption: 05/27/21                        Date of last solid food consumption: 05/26/21    BMI:   Wt Readings from Last 3 Encounters:   05/27/21 140 lb (63.5 kg)   03/16/20 110 lb (49.9 kg)   03/16/20 110 lb (49.9 kg)     Body mass index is 24.03 kg/m².     CBC:   Lab Results   Component Value Date    WBC 7.8 05/27/2021    RBC 3.57 05/27/2021    HGB 9.7 05/27/2021    HCT 29.1 05/27/2021 MCV 81.5 05/27/2021    RDW 14.5 05/27/2021     05/27/2021       CMP:   Lab Results   Component Value Date     05/27/2021    K 3.7 05/27/2021    K 3.5 03/17/2020     05/27/2021    CO2 20 05/27/2021    BUN 7 05/27/2021    CREATININE 0.6 05/27/2021    GFRAA >60 05/27/2021    GFRAA >60 05/02/2013    AGRATIO 1.0 05/27/2021    LABGLOM >60 05/27/2021    GLUCOSE 81 05/27/2021    PROT 6.1 05/27/2021    PROT 8.22 06/01/2010    CALCIUM 7.9 05/27/2021    BILITOT <0.2 05/27/2021    ALKPHOS 816 05/27/2021    AST 30 05/27/2021    ALT 13 05/27/2021       POC Tests: No results for input(s): POCGLU, POCNA, POCK, POCCL, POCBUN, POCHEMO, POCHCT in the last 72 hours. Coags: No results found for: PROTIME, INR, APTT    HCG (If Applicable):   Lab Results   Component Value Date    PREGTESTUR Negative 12/25/2015        ABGs: No results found for: PHART, PO2ART, KJT0EXY, XTD3RPD, BEART, R7JUHQFR     Type & Screen (If Applicable):  No results found for: LABABO, LABRH    Drug/Infectious Status (If Applicable):  No results found for: HIV, HEPCAB    COVID-19 Screening (If Applicable):   Lab Results   Component Value Date    COVID19 Not Detected 05/27/2021           Anesthesia Evaluation  Patient summary reviewed and Nursing notes reviewed  Airway: Mallampati: II  TM distance: <3 FB     Mouth opening: < 3 FB Dental: normal exam         Pulmonary:normal exam                               Cardiovascular:Negative CV ROS                      Neuro/Psych:               GI/Hepatic/Renal: Neg GI/Hepatic/Renal ROS            Endo/Other: Negative Endo/Other ROS                    Abdominal:           Vascular: negative vascular ROS.                                     Lab Results   Component Value Date    WBC 7.8 05/27/2021    HGB 9.7 (L) 05/27/2021    HCT 29.1 (L) 05/27/2021    MCV 81.5 05/27/2021     05/27/2021     Wt Readings from Last 3 Encounters:   05/27/21 140 lb (63.5 kg)   03/16/20 110 lb (49.9 kg)   03/16/20 110 lb (49.9 kg)     Temp Readings from Last 3 Encounters:   05/27/21 36.7 °C (98 °F) (Oral)   03/17/20 36.7 °C (98 °F) (Oral)   03/16/20 37.1 °C (98.7 °F)     BP Readings from Last 3 Encounters:   05/27/21 130/77   03/17/20 103/61   03/16/20 125/89     Pulse Readings from Last 3 Encounters:   05/27/21 56   03/17/20 73   03/16/20 70          Anesthesia Plan      epidural     ASA 2         CVP    Anesthetic plan and risks discussed with patient. Use of blood products discussed with patient whom. Plan discussed with CRNA.     Attending anesthesiologist reviewed and agrees with Pre Eval content              OVIDIO Tolbert - CRNA   5/27/2021

## 2021-05-27 NOTE — H&P
Department of Obstetrics and Gynecology   Obstetrics History and Physical        CHIEF COMPLAINT:  Abdominal pain    HISTORY OF PRESENT ILLNESS:      The patient is a 31 y.o. female at 42w4d. OB History        2    Para   1    Term   1            AB        Living   1       SAB        TAB        Ectopic        Molar        Multiple        Live Births   1            Patient presents with a chief complaint as above and is being admitted for nonreassuring fetal status and induction of labor. Estimated Due Date: Estimated Date of Delivery: 21    PRENATAL CARE:    Complicated by: insufficient prenatal care, cocaine and amphetamine use, severe growth restriction, alcohol use during pregnancy, gonorrhea during pregnancy and hepatitis C.     PAST OB HISTORY:  OB History        2    Para   1    Term   1            AB        Living   1       SAB        TAB        Ectopic        Molar        Multiple        Live Births   1                Past Medical History:        Diagnosis Date    ADHD (attention deficit hyperactivity disorder)     Asthma     Collapsed lung     [facility]     Kidney stones     Pituitary adenoma ([facility]     Pituitary tumor     PNA (pneumonia)     Tonsillitis      Past Surgical History:        Procedure Laterality Date    FOOT SURGERY      RIGHT -- UNSURE WHY    MOUTH SURGERY       Allergies:  Adhesive tape, Amoxicillin, Benadryl [diphenhydramine], Decadron [dexamethasone], Penicillins, Phenergan [promethazine hcl], Prochlorperazine edisylate, Reglan [metoclopramide hcl], and Stadol [butorphanol tartrate]    Social History:    Social History     Socioeconomic History    Marital status: Legally      Spouse name: Not on file    Number of children: Not on file    Years of education: Not on file    Highest education level: Not on file   Occupational History    Not on file   Tobacco Use    Smoking status: Current Every Day Smoker     Packs/day: 0.50 tablet, Take 1 tablet by mouth every 6 hours as needed for Headaches    REVIEW OF SYSTEMS:    Patient with nausea and vomiting in triage, refusing to answer most questions. PHYSICAL EXAM:  Vitals:    05/26/21 2331 05/27/21 0207 05/27/21 0210   BP: 134/87  135/83   Pulse: 71  68   Resp: 18 18 18   Temp: 98.7 °F (37.1 °C) 98.5 °F (36.9 °C)    TempSrc: Oral Oral    Weight:  140 lb (63.5 kg)    Height:  5' 4\" (1.626 m)      General appearance:  awake, alert, noncooperative, agitated in distress, and appears stated age  Neurologic:  Awake, alert, oriented to name, place and time.     Lungs:  No increased work of breathing, good air exchange  Abdomen:  Soft, non tender, gravid, consistent with her gestational age, EFW by Leopold's maneuver was 2400g  Fetal heart rate:  160, mod variability, decels noted  Pelvis:  Adequate pelvis  Cervix: FT  Contraction frequency:  irregular    Membranes:  Intact    Labs:   CBC with Differential:    Lab Results   Component Value Date    WBC 7.8 05/27/2021    RBC 3.57 05/27/2021    HGB 9.7 05/27/2021    HCT 29.1 05/27/2021     05/27/2021    MCV 81.5 05/27/2021    MCH 27.2 05/27/2021    MCHC 33.3 05/27/2021    RDW 14.5 05/27/2021    SEGSPCT 68.6 05/02/2013    LYMPHOPCT 32.2 05/27/2021    MONOPCT 3.2 05/27/2021    EOSPCT 1.7 10/29/2011    BASOPCT 0.6 05/27/2021    MONOSABS 0.3 05/27/2021    LYMPHSABS 2.5 05/27/2021    EOSABS 0.1 05/27/2021    BASOSABS 0.1 05/27/2021    DIFFTYPE Auto 05/02/2013     CMP:    Lab Results   Component Value Date     05/27/2021    K 3.7 05/27/2021    K 3.5 03/17/2020     05/27/2021    CO2 20 05/27/2021    BUN 7 05/27/2021    CREATININE 0.6 05/27/2021    GFRAA >60 05/27/2021    GFRAA >60 05/02/2013    AGRATIO 1.0 05/27/2021    LABGLOM >60 05/27/2021    GLUCOSE 81 05/27/2021    PROT 6.1 05/27/2021    PROT 8.22 06/01/2010    LABALBU 3.0 05/27/2021    CALCIUM 7.9 05/27/2021    BILITOT <0.2 05/27/2021    ALKPHOS 816 05/27/2021    AST 30 05/27/2021 ALT 13 2021       ASSESSMENT AND PLAN:    30 yo  @ 37w1d  1. Nonreassuring fetal status - decels on monitoring. Recommend delivery. Will attempt induction of labor with cervidil. 2. Insufficient prenatal care - care everywhere reviewed and records from [address] [place] [address] noted in a previous progress note. Sever IUGR at 31w6d, facial appearance consistent with fetal alcohol syndrome. No care since that admission. Treated for gonorrhea at that time. GBS was negative on 2021. Was positive for cocaine and methamphetamines at that admission, refused [facility] into the [facility]. Hepatitis C positive. 3. Positive for cocaine, opioids and amphetamine on admission here. Showing signs of withdrawal in triage. Patient initially denied any use, then admitted to a percocet yesterday and then admitted to snorting percocet. Patient will need treatment for withdrawal symptoms in the hospital as well. 4. Dorma Job discussion with the patient about the current fetal status as well as the need to be honest with her current substance abuse in order to treat her safely. Discussed the possibility of a  delivery due to fetal status as well. Patient demanding food, I informed her that I cannot feed her at this time for several reasons, including her vomiting, but most importantly due to the possible need for emergent surgical intervention and the risk of aspiration.

## 2021-05-27 NOTE — FLOWSHEET NOTE
Discharge prescriptions given to pt for Advil, oxycodone, and Tylenol with instructions on use and side effects. See AVS.  Pt verbalized understanding of medications.

## 2021-05-27 NOTE — PROGRESS NOTES
Case Management Mom/Baby Assessment    Identifying Information    Mother of Baby:  Sharon Alicia  Mother's : [id number]                                      Father of Baby: would not name Father's :      [de-identified] Name:  undecided                                       Delivery Date:  21  Nursing concerns for baby:   Prenatal Care:     Reasoning for Referral: pt admits to drug abuse, alcohol abuse, homeless    Assessment Information    Discharge Address: [address] Phone: [phone]    Resides with: lives alone    Emergency Contact: Phone:     Support System: pt mom visiting from [place]    Other [address]    Name: Shawna Christopher  :   Name:  :   Name:  :     Custody:      Father of Baby Involvement:     Have you ever had contact with Children's Services (describe):       Car Seat  Diapers  Crib/Bassinet  Feeding  [place]  States she has items for infant    [address]   Medicaid has [employer] has [employer]   Transportation  has    Setup    Occupation unemployed side jobs     History   Domestic Abuse: denies  Physical Abuse:  denies  Sexual Abuse: denies  Drug Abuse: Pt positive for amphetamines, cocaine, and oxycodone. pt admits to snorting percocet before coming in to delivery. Pt also admits to alcohol during pregnancy. (liquor and beer) Pt states not sure if percocet  was laced  with cocaine or not. Pt states she didn't take amphetamines. Prescription /Pharmacy Name Location Number:   Depression: states anxiety. Not interested in mental health resources  Medication/Counseling: denies    Summary: Pt seen by  alone. Pt aware she is positive for for amphetamines, cocaine, and oxycodone. Pt aware a report will be made to [facility]. Pt states she has government services and items for infant in place. Pt admits to history and current drug abuse.  This  placed call to [facility] intake Saman. Ernestine Webster states a case will be open tomorrow. Pt NOT clear to d/c infant. Referrals: pt denies referrals.       Intervention: report made and case will be open    [facility], [place]

## 2021-05-27 NOTE — FLOWSHEET NOTE
Patient transferred to postpartum by self and settled into postpartum room 4405 after pt spent extended time in SCN with . Pt oriented to folder and postpartum care. Oriented to call light, phone and ordering meals. Postpartum RN's name and phone number posted for pt. Siderails up x2. Pt oriented to equipment. Report given. Pt included in discussion and all questions answered.

## 2021-05-27 NOTE — PROGRESS NOTES
Most recent ultrasound results for [facility] in care everywhere. Severe growth restriction noted 1 month ago. Also appearance of face on ultrasound consistent with Fetal Alcohol Syndrome. Gonorrhea also noted on 21. Was positive for cocaine and methamphetamine on 2021. Noted to be HCV positive as well. Interface, Rad Results In - 2021 3:47 PM EDT    Formatting of this note might be different from the original.  Indication  ========    Indication: Substance Abuse Complicating Pregnancy  Indication: Cocaine Abuse Complicating Pregnancy  Indication: Alcohol Abuse  Indication: Tobacco Use Complicating Pregnancy, Other Tobacco  Indication: Late Prenatal Care    History  ======    General History  Height 163 cm  Height (ft) 5 ft  Height (in) 4 in  Previous Outcomes   2  Para 1  Clements children born (T) 1  Clements children born (P) 0  Abortions (A) 0  Clements living children (L) 1    Maternal Assessment  =================    Height 163 cm  Height (ft) 5 ft  Height (in) 4 in  Physical Exam  Initial weight 59 kg  Initial weight (lb) 130 lb  Initial BMI 22.31 kg/m²    Method  ======    Transabdominal ultrasound examination. View: Suboptimal view: limited by fetal position. Suboptimal  view: limited by late gestational age    Pregnancy  =========    Clements pregnancy. Number of fetuses: 1    Dating  ======    LMP on: 2020  GA by LMP 32 w + 6 d  NICOEL by LMP: 2021  Ultrasound examination on: 2021  GA by U/S based upon: AC, BPD, Femur, HC  GA by U/S 28 w + 5 d  NICOLE by U/S: 2021  Assigned: based on the LMP, selected on 2020  Assigned GA 31 w + 6 d  Assigned NICOLE: 2021    General Evaluation  ==============    Cardiac activity present.  bpm. Presentation: cephalic  Placenta: Placental site: posterior, left, No evidence of placenta previa  Umbilical cord: Cord vessels: 3 vessel cord.  Insertion site: normal insertion  Amniotic fluid: Amount of AF: normal amount, subjectively low. MVP 5.7 cm. DAVID 10.5 cm. Q1 5.7 cm,  Q2 1.9 cm, Q3 1.8 cm, Q4 1.1 cm    Impression  =========    Severe intrauterine growth restriction (IUGR) is identified. The estimated fetal weight is < 1st  percentile and lagging by 22 days. The amniotic fluid volume is normal,  however, appears subjectively decreased. Right sided hydronephrosis is identified with the pelvis measuring 15.2 mm. The right ureter is dilated and noted extending to the bladder with the largest portion measuring  7.3 mm. The nasal bone appears hypoplastic. The suboptimal views of the face looks similar to fetal alcohol  syndrome facies. The patient is currently being evaluated in the Antepartum unit. Report to Dr. Severiano Nida.

## 2021-05-27 NOTE — FLOWSHEET NOTE
Transported pt to [facility] to see  and review history with pediatrician and receive update on infant's plan of care.

## 2021-05-27 NOTE — FLOWSHEET NOTE
Patient wanting to go to nursery now. Walked with patient and her Mom to nursery to see infant. Instructed patient to call nurse to help her back to her room when she is ready to go back.

## 2021-05-27 NOTE — PROGRESS NOTES
S: Patient states she can feel her contraction. Declined methadone, repeatedly requesting to eat. O: /69   Pulse 69   Temp 98 °F (36.7 °C) (Oral)   Resp 16   Ht 5' 4\" (1.626 m)   Wt 140 lb (63.5 kg)   BMI 24.03 kg/m²   FHT - 150, mod variability, decels present, no accels    A/P: 30 yo  @ 37w1d  1. Nonreassuring fetal NST - Induction started due to multiple decelerations on fetal monitoring. Discussed the possible need for  delivery due to fetal indications. All questions answered. FHT currently has moderate variability and patient does not desire  delivery at this time. 2 Substance abuse - patient actively withdrawing on arrival. Initially denied any substance use. UDS positive for amphetamine, cocaine and opiates. Admitted to snorting pills at her friend's house. Patient refused methadone treatment. Appreciate input from the hospitalist.  3. Insufficient prenatal care - admission at [facility] on 4/10- for withdrawal and planned admission into [facility] program, patient never followed up for any outpatient care.

## 2021-05-27 NOTE — FLOWSHEET NOTE
Pt remains irritable. Cussing and yelling at someone on the phone. Pt states needs to use restroom, irritable that pt placed on bedpan and RN advising pt not to get out of bed for continuous EFM. Pt voiding large amount of urine. States needs something to eat. Advised must have order from provider prior to allowing pt to eat.

## 2021-05-27 NOTE — PLAN OF CARE
Problem: Pain:  Goal: Pain level will decrease  Description: Pain level will decrease  Outcome: Ongoing  Goal: Control of acute pain  Description: Control of acute pain  Outcome: Ongoing  Goal: Control of chronic pain  Description: Control of chronic pain  Outcome: Ongoing   Pt pain currently controlled with spinal, was tolerating pain of contractions well prior to  section.

## 2021-05-27 NOTE — FLOWSHEET NOTE
Pt becomes agitated easily and verbally lashes out against staff in attempt to get whatever she desires st time. Currently frustrated because cannot see  yet as infant is in [employer] and she is recvoering after . Repeatedly encouraged pt that RN would take her to see infant as soon as possible and that infant is stable in nursery. Pt's mother at bedside and attempting to calm patient.

## 2021-05-27 NOTE — FLOWSHEET NOTE
Patient up in room, took off her compression boots and pulse ox, up to bathroom,  7355 vital sign and assessments done. Pt wants to go outside. Pt. Instructed not to go outside. Patients Mom here with her.

## 2021-05-27 NOTE — ANESTHESIA PROCEDURE NOTES
Spinal Block    Patient location during procedure: OB  Start time: 5/27/2021 9:37 AM  End time: 5/27/2021 9:44 AM  Reason for block: primary anesthetic  Staffing  Performed: resident/CRNA   Anesthesiologist: Reginald Duane, MD  Resident/CRNA: OVIDIO Forde CRNA  Preanesthetic Checklist  Completed: patient identified, IV checked, site marked, risks and benefits discussed, surgical consent, monitors and equipment checked, pre-op evaluation, timeout performed, anesthesia consent given, oxygen available and patient being monitored  Spinal Block  Patient position: sitting  Prep: ChloraPrep and site prepped and draped  Patient monitoring: cardiac monitor, continuous pulse ox, continuous capnometry and frequent blood pressure checks  Approach: midline  Location: L2/L3  Provider prep: mask and sterile gloves  Dose: 0.2  Agent: bupivacaine  Adjuvant: duramorph  Dose: 1.5  Dose: 1.5  Needle  Needle type: Pencan   Needle gauge: 25 G  Needle length: 3.5 in  Assessment  Sensory level: T6  Swirl obtained: Yes  CSF: clear  Attempts: 1  Hemodynamics: stable

## 2021-05-27 NOTE — ANESTHESIA POSTPROCEDURE EVALUATION
Department of Anesthesiology  Postprocedure Note    Patient: Jesús Kiran  MRN: [id number]  YOB: 1985  Date of evaluation: 2021  Time:  12:33 PM     Procedure Summary     Date: 21 Room / Location: [facility] OR [address] [place] [address]    Anesthesia Start: 4049 Anesthesia Stop:     Procedures:        SECTION (N/A Abdomen)      Labor Analgesia Diagnosis: (NRFHR)    Surgeons: Alek Moyer MD Responsible Provider: Char Martinez MD    Anesthesia Type: epidural ASA Status: 2          Anesthesia Type: epidural    Moris Phase I: Moris Score: 8    Moris Phase II:      Last vitals: Reviewed and per EMR flowsheets.        Anesthesia Post Evaluation    Patient location during evaluation: PACU  Patient participation: complete - patient participated  Level of consciousness: awake and alert  Airway patency: patent  Nausea & Vomiting: no nausea and no vomiting  Complications: no  Cardiovascular status: blood pressure returned to baseline and hemodynamically stable  Respiratory status: acceptable  Hydration status: stable

## 2021-05-27 NOTE — FLOWSHEET NOTE
Pt refusing Methadone. States \" I don't need it\". Tried to explain to pt the reason for her to take the medicine, and pt continues to refuse.

## 2021-05-27 NOTE — FLOWSHEET NOTE
Pt agitated when signing consents-states \"you guys are going to do what you want anyway\", and pt states does not consent to any vaccinations or pictures being taken of her. Pt advised that she has a right to refuse treatment and nothing will be done without her consent.  Consents signed and verbal consent obtained for UDS

## 2021-05-27 NOTE — PROCEDURES
Department of Obstetrics and Gynecology   Section Note    Indications: non-reassuring fetal status    Pre-operative Diagnosis: 37 week 1 day pregnancy. Post-operative Diagnosis: Living  infant(s) and Female    Procedure: primary low transverse  section    Surgeon: Zo Velasco     Assistants:      Anesthesia: Spinal anesthesia    Procedure Details   The patient was seen in the Holding Room. The risks, benefits, complications, treatment options, and expected outcomes were discussed with the patient. The patient concurred with the proposed plan, giving informed consent. The site of surgery properly noted/marked. The patient was taken to Operating Room # 2, identified as Gloria Senior and the procedure verified as  Delivery. A Time Out was held and the above information confirmed. After induction of anesthesia, the patient was draped and prepped in the usual sterile manner. A Pfannenstiel incision was made and carried down through the subcutaneous tissue to the fascia. Fascial incision was made and extended transversely. The fascia was  from the underlying rectus tissue superiorly and inferiorly. The peritoneum was identified and entered bluntly. Peritoneal incision was extended longitudinally with traction. The utero-vesical peritoneal reflection was incised transversely and the bladder flap was bluntly freed from the lower uterine segment. A low transverse uterine incision was made. Delivered from [address] [place] [address] presentation with findings listed below. After the umbilical cord was clamped and cut cord blood was obtained for evaluation. The placenta was removed intact and appeared normal. The uterine outline, tubes and ovaries appeared normal. The uterine incision was closed with a running suture of 0 Vicryl. A second layer of 0-monocryl was then placed to acquire hemostasis. Lavage was carried out until clear. The peritoneum was closed with running 3-0 vicryl.  The fascia was then reapproximated with running sutures of 0 Vicryl. The sub-Q was closed with 3-0 vicryl suture. The skin was reapproximated with a 4-0 monocryl suture. Instrument, sponge, and needle counts were correct prior the abdominal closure and at the conclusion of the case. Findings:  Information for the patient's :  Lars Cortes Gloria [[id number]]   Birth Weight: 3 lb 11.6 oz (1.69 kg)      Information for the patient's :  Lars Cortes Gloria [[id number]]   APGAR One: N/A     Information for the patient's :  Lars Cortes Gloria [[id number]]   APGAR Five: N/A       Uterus, tubes and ovaries were normal    Estimated Blood Loss:  750ml           Drains: none           Total IV Fluids: ml           Specimens: cord blood, cord gases, placenta                 Complications:  none           Disposition: PACU - hemodynamically stable. Condition: infant stable to special care nursery and mother stable    Attending Attestation: I performed the procedure.   \

## 2021-05-27 NOTE — DISCHARGE SUMMARY
Obstetrical Discharge Form    Gestational Age: 42w4d    Antepartum complications: intrauterine growth restriction and non-reassuring fetal testing    Date of Delivery: 21      Type of Delivery:  for fetal distress    Delivered By:      Charly Pereira:      Information for the patient's :  Mone Castro [[id number]]   APGAR One: N/A     Information for the patient's :  Mone Castro [[id number]]   APGAR Five: N/A     Information for the patient's :  Mone Castro [[id number]]   Birth Weight: 3 lb 11.6 oz (1.69 kg)       Anesthesia: Spinal    Intrapartum complications: Non-reassuring Fetal Status    Postpartum complications: none    Discharge Medication:    Jenelle Virgen   Home Medication Instructions AOX:[id number]    Printed on:21 1028   Medication Information                      acetaminophen (TYLENOL) 500 MG tablet  Take 1 tablet by mouth every 6 hours as needed for Pain             albuterol (ACCUNEB) 0.63 MG/3ML nebulizer solution  Take 1 ampule by nebulization every 6 hours as needed for Wheezing             docusate sodium (COLACE) 100 MG capsule  Take 100 mg by mouth 2 times daily             ibuprofen (ADVIL;MOTRIN) 600 MG tablet  Take 1 tablet by mouth every 6 hours as needed for Pain             oxyCODONE (ROXICODONE) 5 MG immediate release tablet  Take 1 tablet by mouth every 6 hours as needed for Pain for up to 5 days. Intended supply: 5 days. Take lowest dose possible to manage pain             Prenatal Vit-Fe Fumarate-FA (PRENATAL 1+1 PO)  Take by mouth                  Discharge Condition:  stable    Discharge Date: 2021    PLAN:  Follow up in 6 weeks for routine PP visit  All questions answered  D/C summary begun at delivery for D/C planning purposes, any delay in discharge from ordered D/C date due to  factors.

## 2021-05-28 LAB
BASOPHILS ABSOLUTE: 0.1 K/UL (ref 0–0.2)
BASOPHILS RELATIVE PERCENT: 0.6 %
C. TRACHOMATIS DNA ,URINE: NEGATIVE
EOSINOPHILS ABSOLUTE: 0.2 K/UL (ref 0–0.6)
EOSINOPHILS RELATIVE PERCENT: 1.6 %
HCT VFR BLD CALC: 28.9 % (ref 36–48)
HEMOGLOBIN: 9.7 G/DL (ref 12–16)
LYMPHOCYTES ABSOLUTE: 3.2 K/UL (ref 1–5.1)
LYMPHOCYTES RELATIVE PERCENT: 28.4 %
MCH RBC QN AUTO: 27 PG (ref 26–34)
MCHC RBC AUTO-ENTMCNC: 33.4 G/DL (ref 31–36)
MCV RBC AUTO: 80.9 FL (ref 80–100)
MONOCYTES ABSOLUTE: 0.3 K/UL (ref 0–1.3)
MONOCYTES RELATIVE PERCENT: 2.7 %
N. GONORRHOEAE DNA, URINE: NEGATIVE
NEUTROPHILS ABSOLUTE: 7.6 K/UL (ref 1.7–7.7)
NEUTROPHILS RELATIVE PERCENT: 66.7 %
PDW BLD-RTO: 14.3 % (ref 12.4–15.4)
PLATELET # BLD: 239 K/UL (ref 135–450)
PMV BLD AUTO: 9.2 FL (ref 5–10.5)
RBC # BLD: 3.58 M/UL (ref 4–5.2)
WBC # BLD: 11.4 K/UL (ref 4–11)

## 2021-05-28 PROCEDURE — 1220000000 HC SEMI PRIVATE OB R&B

## 2021-05-28 PROCEDURE — 51701 INSERT BLADDER CATHETER: CPT

## 2021-05-28 PROCEDURE — 85025 COMPLETE CBC W/AUTO DIFF WBC: CPT

## 2021-05-28 PROCEDURE — 6370000000 HC RX 637 (ALT 250 FOR IP): Performed by: OBSTETRICS & GYNECOLOGY

## 2021-05-28 RX ORDER — LORAZEPAM 1 MG/1
2 TABLET ORAL
Status: DISCONTINUED | OUTPATIENT
Start: 2021-05-28 | End: 2021-05-31 | Stop reason: HOSPADM

## 2021-05-28 RX ORDER — LORAZEPAM 1 MG/1
5 TABLET ORAL ONCE
Status: DISCONTINUED | OUTPATIENT
Start: 2021-05-28 | End: 2021-05-31 | Stop reason: HOSPADM

## 2021-05-28 RX ADMIN — OXYCODONE 10 MG: 5 TABLET ORAL at 03:18

## 2021-05-28 RX ADMIN — IBUPROFEN 800 MG: 800 TABLET, FILM COATED ORAL at 20:21

## 2021-05-28 RX ADMIN — OXYCODONE 10 MG: 5 TABLET ORAL at 09:28

## 2021-05-28 RX ADMIN — LORAZEPAM 2 MG: 1 TABLET ORAL at 09:28

## 2021-05-28 RX ADMIN — OXYCODONE 5 MG: 5 TABLET ORAL at 19:07

## 2021-05-28 RX ADMIN — OXYCODONE 5 MG: 5 TABLET ORAL at 13:36

## 2021-05-28 RX ADMIN — DOCUSATE SODIUM 100 MG: 100 CAPSULE, LIQUID FILLED ORAL at 16:10

## 2021-05-28 RX ADMIN — SIMETHICONE 80 MG: 80 TABLET, CHEWABLE ORAL at 16:10

## 2021-05-28 RX ADMIN — OXYCODONE 5 MG: 5 TABLET ORAL at 21:13

## 2021-05-28 ASSESSMENT — PAIN SCALES - GENERAL
PAINLEVEL_OUTOF10: 8
PAINLEVEL_OUTOF10: 7
PAINLEVEL_OUTOF10: 8
PAINLEVEL_OUTOF10: 9

## 2021-05-28 NOTE — PROGRESS NOTES
Social Service Note:  [facility]  Joann Cooney here to see [facility] ([address] [address]) and infant. Filemon cell is [phone] and work phone is [phone]. Filemon states meeting with [address] went well. Gloria agreed to think of possible caregivers to care for infant after discharge. Please contact [facility] Case Management for additional help with case/discahrge plan.      Latanya Judd BSW, [place]

## 2021-05-28 NOTE — PROGRESS NOTES
Entered room to check on pt. Pt on stomach, sitting on knees, playing on her phone in bed. Pt requested pain medication so she can get up and take a shower. Nurse discussed removing adhesive dressing while in the shower. Pt agreed to attempt to remove dressing at that time. Nurse scanned pt and medication to give. Pt had fallen asleep. Nurse woke pt to change her position so pt did not fall out of bed. After pt in middle of bed, pt fell asleep. Narcotic pain medication was not given at this time. Bedside table moved closer to pt. Call light left within reach. Meds returned/ wasted with witness.

## 2021-05-28 NOTE — PROGRESS NOTES
Nurse entered room with Dr Flavio Triplett. Pt states she is in pain. Dr Flavio Triplett discussed with pt the need to look at incision. Pt refused to allow Dr Flavio Triplett and nurse to remove the adhesive dressing. During conversation, pt admitted to taking percocet throughout pregnancy. When asked about other drugs taken during pregnancy and the possibility of rehab, the pt started yelling that she did not need rehab. She stood and started moving and pacing around the room yelling that no one is listening to her. Pt finally went into the bathroom and closed the door screaming she wanted percocet for the pain. Dr Flavio Triplett and nurse left room. Nurse re-entered room to give pain medication. When entered room, pt mother at bedside and pt and mother arguing. She insisted she needed a cigarette and demanded her mother give her one. Her mother refused, and told her she was not allowed to leave the hospital. Pt started yelling she needed a cigarette. Nurse asked pt to calm down and tried to explain that pt is under medical care and was not able to smoke on the property. Charge nurse entered room and calmly asked pt to calm down. Charge nurse explained to pt she can not yell and asked how we could help her. Pt screamed we (the nurses) didn't know her and she demanded us (the nurses) to leave the room. As we exited the room, pt began to repeatedly scream at her mother that she \"is not a drug addict! \" Concerned for pt safety and the safety of her guest, security was called. Dr Flavio Triplett was updated on what had happened after he left, and he re-entered room to talk with pt. Dr Flavio Triplett was able to calm pt and pt agreed on pain medication and a dose of ativan. Will give prescribed medication and continue to monitor.

## 2021-05-28 NOTE — PROGRESS NOTES
[facility] post-partum note    Post-partum Day #1    Subjective:  Pain is : Mild  Lochia: staining only  Nausea: Mild  Bowel Movement/Flatus:  yes  Breastfeeding: plans to bottle feed    Objective:  Patient Vitals for the past 24 hrs:   BP Temp Temp src Pulse Resp SpO2   05/28/21 0055 112/84 98.2 °F (36.8 °C) Oral 98 18    05/27/21 1745 133/87 98 °F (36.7 °C) Oral 55 18    05/27/21 1548 (!) 140/74 98.1 °F (36.7 °C) Oral 65 16 98 %   05/27/21 1245 (!) 144/86 97.6 °F (36.4 °C) Axillary 50 16 98 %   05/27/21 1230      98 %   05/27/21 1224 121/83   54 16    05/27/21 1215      98 %   05/27/21 1209 137/88   50 16    05/27/21 1154 113/88   60 16    05/27/21 1145      96 %   05/27/21 1139 (!) 134/91   59 17    05/27/21 1130      96 %   05/27/21 1125 128/88   71 16    05/27/21 1110 128/86   51 14    05/27/21 1050 122/83   54 14    05/27/21 1038 120/84 98 °F (36.7 °C) Oral 62 16       Abdominal exam: soft, nontender, nondistended, no masses or organomegaly.      Wound: dressing c/d/i        Lab Results   Component Value Date    WBC 11.4 (H) 05/28/2021    HGB 9.7 (L) 05/28/2021    HCT 28.9 (L) 05/28/2021    MCV 80.9 05/28/2021     05/28/2021          Current Facility-Administered Medications:     LORazepam (ATIVAN) tablet 5 mg, 5 mg, Oral, Once, Janelle Iverson MD    LORazepam (ATIVAN) tablet 2 mg, 2 mg, Oral, Q3H PRN, Janelle Iverson MD    lactated ringers infusion, , Intravenous, Continuous, Vero Christopher MD, Stopped at 05/27/21 2205    sodium chloride flush 0.9 % injection 10 mL, 10 mL, Intravenous, 2 times per day, Vero Christopher MD    sodium chloride flush 0.9 % injection 10 mL, 10 mL, Intravenous, PRN, Vero Christopher MD    0.9 % sodium chloride infusion, 25 mL, Intravenous, PRN, Vero Christopher MD    ketorolac (TORADOL) injection 30 mg, 30 mg, Intravenous, Q6H, Vero Christopher MD, 30 mg at 05/27/21 2067    ketorolac (TORADOL) injection 30 mg, 30 mg, Intravenous, Q8H, Alek Moyer MD    ibuprofen (ADVIL;MOTRIN) tablet 800 mg, 800 mg, Oral, 3 times per day, Alek Moyer MD    acetaminophen (TYLENOL) tablet 1,000 mg, 1,000 mg, Oral, 3 times per day, Alek Moyer MD    oxyCODONE (ROXICODONE) immediate release tablet 5 mg, 5 mg, Oral, Q4H PRN **OR** oxyCODONE (ROXICODONE) immediate release tablet 10 mg, 10 mg, Oral, Q4H PRN, Alek Moyer MD, 10 mg at 05/28/21 0318    ondansetron (ZOFRAN) injection 4 mg, 4 mg, Intravenous, Q6H PRN, Alek Moyer MD    diphenhydrAMINE (BENADRYL) injection 25 mg, 25 mg, Intravenous, Q6H PRN, Alek Moyer MD    [facility]) chewable tablet 80 mg, 80 mg, Oral, Q6H PRN, Alek Moyer MD    docusate sodium (COLACE) capsule 100 mg, 100 mg, Oral, BID, Alek Moyer MD, 100 mg at 05/27/21 1212    lansinoh lanolin ointment, , Topical, Q1H PRN, Alek Moyer MD    ferrous sulfate (IRON 325) tablet 325 mg, 325 mg, Oral, BID WC, Alek Moyer MD    Tetanus-Diphth-Acell Pertussis (BOOSTRIX) injection 0.5 mL, 0.5 mL, Intramuscular, Prior to discharge, Alek Moyer MD     Assessment/Plan:      Continue Postpartum care  Discharge today: no  Follow up: 1 weeks    Significant agitation, patient initially refused oxycodone for pain this am.  Will give ativan 2mg prn agitation/withdrawl. Oxycodone for pain control    Social work consult for substance use disorder.

## 2021-05-28 NOTE — FLOWSHEET NOTE
Offered pt tylenol to take for pain. Pt declines, states she already took some that she had in her purse. Advised pt that she should not be taking any home medications while an inpatient in the hospital and if she needs something for pain to let us know and we will give her something.

## 2021-05-28 NOTE — FLOWSHEET NOTE
Pt requesting dwyer cath be removed at this time. Dwyer removed. Instructed pt on igor care. Verbalizes understanding. Pt stating she does not want to be connected to an IV any more, says she will \"disconnect the IV myself if you don't do it for me. \" Pt states she needs to go to her car to get shower supplies. I offered to give pt shower supplies, pt refused. Instructed pt she can't go outside. Pt states \"I'm not in intermediate, I sure can go outside. \"     Call placed to Dr. Fleeta Epley again. Orders given to pull IV so pt has no IV access. IV removed at this time.

## 2021-05-28 NOTE — PROGRESS NOTES
Pain medication and ativan given to pt. Pt allowed nurse to take vital signs, but is refusing assessment at this time. Nurse helped pt reposition. Will address assessment at another time.

## 2021-05-28 NOTE — PROGRESS NOTES
Pt called out to have assistance with ordering dinner. Dietary at the desk stating she calls downstairs, but falls asleep when on phone. Pt awake and asking appropriate questions. Dietary entered and took order for dinner. Pt asked about CPS interview and showed pt pamphlet left by CPS with social workers name and phone number. Assisted pt to bathroom. Minimal bleeding noted on pad. Pt allowed nurse to do assessment without a fundal check. Will ask again once pain medication given and decrease in pain.

## 2021-05-28 NOTE — FLOWSHEET NOTE
Pt ambulating in gore. Asked pt if she was just in SCN, pt stated she was outside. Instructed pt again that she is not allowed to go outside due to safety reasons. Pt responded with \"okay\".

## 2021-05-28 NOTE — PROGRESS NOTES
Social Service Note:  Called [place] CPS Intake Jennifer. Jennifer states case assigned to Anthony Tay at [phone].   LEft Message for File[place] regarding her plan to come to hospital.    NEIL Hendrix

## 2021-05-28 NOTE — FLOWSHEET NOTE
Pt lying in bed screaming, stating she is in so much pain but states she doesn't want to take anything for pain because \" I want to prove that I'm not a drug addict\". Offered pt pain medicine but informed her, again, that she is not allowed to go outside if I give her narcotics. Pt and pt's mother both verbalize understanding.

## 2021-05-29 LAB
CYTOMEGALOVIRUS IGG ANTIBODY: >10 U/ML
TOXOPLASMA GONDII AB IGG: <3 IU/ML

## 2021-05-29 PROCEDURE — 1220000000 HC SEMI PRIVATE OB R&B

## 2021-05-29 PROCEDURE — 6370000000 HC RX 637 (ALT 250 FOR IP): Performed by: OBSTETRICS & GYNECOLOGY

## 2021-05-29 RX ADMIN — FERROUS SULFATE TAB 325 MG (65 MG ELEMENTAL FE) 325 MG: 325 (65 FE) TAB at 09:01

## 2021-05-29 RX ADMIN — OXYCODONE 10 MG: 5 TABLET ORAL at 06:18

## 2021-05-29 RX ADMIN — ACETAMINOPHEN 1000 MG: 500 TABLET ORAL at 08:59

## 2021-05-29 RX ADMIN — IBUPROFEN 800 MG: 800 TABLET, FILM COATED ORAL at 06:18

## 2021-05-29 RX ADMIN — OXYCODONE 10 MG: 5 TABLET ORAL at 00:51

## 2021-05-29 RX ADMIN — ACETAMINOPHEN 1000 MG: 500 TABLET ORAL at 20:05

## 2021-05-29 RX ADMIN — IBUPROFEN 800 MG: 800 TABLET, FILM COATED ORAL at 15:05

## 2021-05-29 RX ADMIN — OXYCODONE 10 MG: 5 TABLET ORAL at 20:06

## 2021-05-29 RX ADMIN — OXYCODONE 10 MG: 5 TABLET ORAL at 13:32

## 2021-05-29 ASSESSMENT — PAIN SCALES - GENERAL
PAINLEVEL_OUTOF10: 9
PAINLEVEL_OUTOF10: 8
PAINLEVEL_OUTOF10: 9
PAINLEVEL_OUTOF10: 7

## 2021-05-29 NOTE — PROGRESS NOTES
Subjective:     Postpartum Day 2:  Delivery    The patient feels tired. The patient denies emotional concerns. Pain is moderately controlled with current medications as long as she doesn't move, declining ambulation currently. The baby is in SCN. Urinary output is adequate. The patient is ambulating well. The patient is tolerating a normal diet. Flatus has been passed. Objective:      No data found. General:    alert, appears stated age and cooperative       Lochia:  appropriate   Uterine Fundus:   firm   Incision:  healing well, no significant drainage, no dehiscence, no significant erythema   DVT Evaluation:  No evidence of DVT seen on physical exam.     Lab Results   Component Value Date    WBC 11.4 (H) 2021    HGB 9.7 (L) 2021    HCT 28.9 (L) 2021    MCV 80.9 2021     2021        Assessment:     Status post  section. Postoperative course complicated by substance use disorder and agitation     Plan:     Continue current care.   Ativan PRN for agitation/withdrawal  SW following

## 2021-05-29 NOTE — FLOWSHEET NOTE
After vital signs and assessment, patient went to nursery to see her infant. Refused to take any medication at this time. Rico Plants it was making her too sleepy.

## 2021-05-29 NOTE — PLAN OF CARE
Problem: Pain:  Goal: Pain level will decrease  Description: Pain level will decrease  Outcome: Ongoing  Goal: Control of acute pain  Description: Control of acute pain  Outcome: Ongoing  Goal: Control of chronic pain  Description: Control of chronic pain  Outcome: Ongoing     Problem: Discharge Planning:  Goal: Discharged to appropriate level of care  Description: Discharged to appropriate level of care  Outcome: Ongoing     Problem: Fluid Volume - Imbalance:  Goal: Absence of postpartum hemorrhage signs and symptoms  Description: Absence of postpartum hemorrhage signs and symptoms  Outcome: Met This Shift  Goal: Absence of imbalanced fluid volume signs and symptoms  Description: Absence of imbalanced fluid volume signs and symptoms  Outcome: Met This Shift     Problem: Infection - Surgical Site:  Goal: Will show no infection signs and symptoms  Description: Will show no infection signs and symptoms  Outcome: Met This Shift     Problem: Mood - Altered:  Goal: Mood stable  Description: Mood stable  Outcome: Ongoing     Problem: Nausea/Vomiting:  Goal: Absence of nausea/vomiting  Description: Absence of nausea/vomiting  Outcome: Met This Shift     Problem: Pain - Acute:  Goal: Pain level will decrease  Description: Pain level will decrease  Outcome: Ongoing     Problem: Venous Thromboembolism:  Goal: Will show no signs or symptoms of venous thromboembolism  Description: Will show no signs or symptoms of venous thromboembolism  Outcome: Ongoing  Goal: Absence of signs or symptoms of impaired coagulation  Description: Absence of signs or symptoms of impaired coagulation  Outcome: Ongoing     Problem: Falls - Risk of:  Goal: Will remain free from falls  Description: Will remain free from falls  Outcome: Met This Shift  Goal: Absence of physical injury  Description: Absence of physical injury  Outcome: Met This Shift

## 2021-05-30 LAB
HCV QNT BY NAAT IU/ML: NOT DETECTED IU/ML
HCV QNT BY NAAT LOG IU/ML: NOT DETECTED LOG IU/ML
HERPES TYPE I/II IGG COMBINED: >22.4 IV
INTERPRETATION: NOT DETECTED

## 2021-05-30 PROCEDURE — 1220000000 HC SEMI PRIVATE OB R&B

## 2021-05-30 PROCEDURE — 6370000000 HC RX 637 (ALT 250 FOR IP): Performed by: OBSTETRICS & GYNECOLOGY

## 2021-05-30 RX ADMIN — IBUPROFEN 800 MG: 800 TABLET, FILM COATED ORAL at 10:32

## 2021-05-30 RX ADMIN — FERROUS SULFATE TAB 325 MG (65 MG ELEMENTAL FE) 325 MG: 325 (65 FE) TAB at 09:10

## 2021-05-30 RX ADMIN — ACETAMINOPHEN 1000 MG: 500 TABLET ORAL at 21:57

## 2021-05-30 RX ADMIN — OXYCODONE 10 MG: 5 TABLET ORAL at 21:57

## 2021-05-30 RX ADMIN — OXYCODONE 10 MG: 5 TABLET ORAL at 03:05

## 2021-05-30 RX ADMIN — OXYCODONE 10 MG: 5 TABLET ORAL at 10:32

## 2021-05-30 RX ADMIN — OXYCODONE 10 MG: 5 TABLET ORAL at 17:51

## 2021-05-30 RX ADMIN — IBUPROFEN 800 MG: 800 TABLET, FILM COATED ORAL at 03:05

## 2021-05-30 RX ADMIN — ACETAMINOPHEN 1000 MG: 500 TABLET ORAL at 06:00

## 2021-05-30 ASSESSMENT — PAIN SCALES - GENERAL
PAINLEVEL_OUTOF10: 8
PAINLEVEL_OUTOF10: 8
PAINLEVEL_OUTOF10: 9
PAINLEVEL_OUTOF10: 6

## 2021-05-30 NOTE — PLAN OF CARE
Problem: Pain:  Goal: Pain level will decrease  Description: Pain level will decrease  Outcome: Ongoing  Goal: Control of acute pain  Description: Control of acute pain  Outcome: Ongoing  Goal: Control of chronic pain  Description: Control of chronic pain  Outcome: Ongoing     Problem: Discharge Planning:  Goal: Discharged to appropriate level of care  Description: Discharged to appropriate level of care  Outcome: Ongoing     Problem: Fluid Volume - Imbalance:  Goal: Absence of postpartum hemorrhage signs and symptoms  Description: Absence of postpartum hemorrhage signs and symptoms  Outcome: Ongoing  Goal: Absence of imbalanced fluid volume signs and symptoms  Description: Absence of imbalanced fluid volume signs and symptoms  Outcome: Ongoing     Problem: Infection - Surgical Site:  Goal: Will show no infection signs and symptoms  Description: Will show no infection signs and symptoms  Outcome: Met This Shift     Problem: Mood - Altered:  Goal: Mood stable  Description: Mood stable  Outcome: Ongoing     Problem: Nausea/Vomiting:  Goal: Absence of nausea/vomiting  Description: Absence of nausea/vomiting  Outcome: Met This Shift     Problem: Pain - Acute:  Goal: Pain level will decrease  Description: Pain level will decrease  Outcome: Ongoing     Problem: Urinary Retention:  Goal: Urinary elimination within specified parameters  Description: Urinary elimination within specified parameters  Outcome: Ongoing     Problem: Venous Thromboembolism:  Goal: Will show no signs or symptoms of venous thromboembolism  Description: Will show no signs or symptoms of venous thromboembolism  Outcome: Ongoing  Goal: Absence of signs or symptoms of impaired coagulation  Description: Absence of signs or symptoms of impaired coagulation  Outcome: Ongoing     Problem: Falls - Risk of:  Goal: Will remain free from falls  Description: Will remain free from falls  Outcome: Met This Shift  Goal: Absence of physical injury  Description: Absence of physical injury  Outcome: Met This Shift

## 2021-05-30 NOTE — PROGRESS NOTES
[facility] post-partum note    Post-partum Day #3    Subjective:    No new c/o. Feels \"stiff\", encouraged to ambulate to feel better. Discussed plans at D/C, will stay at a hotel.   Lochia: light    Objective:  Vitals:    05/29/21 0030 05/29/21 0850 05/29/21 1650 05/30/21 0040   BP: 126/78 133/82 (!) 143/95 (!) 141/95   Pulse: 82 73 86 94   Resp: 16 16 18 16   Temp: 98.6 °F (37 °C) 97.8 °F (36.6 °C) 98 °F (36.7 °C) 98.6 °F (37 °C)   TempSrc: Oral Oral Oral Oral   SpO2:       Weight:       Height:           Physical Examination:  Initially appears lethargic, falls asleep while talking but became angry when asked about D/C plans  Uterus: Firm, NT  Incision C/D/I  Calves: NT    Labs:    Recent Labs     05/28/21  0610   WBC 11.4*   HGB 9.7*   HCT 28.9*              Current Facility-Administered Medications:     LORazepam (ATIVAN) tablet 5 mg, 5 mg, Oral, Once, Katherine Bryant MD    LORazepam (ATIVAN) tablet 2 mg, 2 mg, Oral, Q3H PRN, Katherine Bryant MD, 2 mg at 05/28/21 0970    lactated ringers infusion, , Intravenous, Continuous, Patsy Humphries MD, Stopped at 05/27/21 2205    sodium chloride flush 0.9 % injection 10 mL, 10 mL, Intravenous, 2 times per day, Patsy Humphries MD    sodium chloride flush 0.9 % injection 10 mL, 10 mL, Intravenous, PRN, Patsy Humphries MD    0.9 % sodium chloride infusion, 25 mL, Intravenous, PRN, Patsy Humphries MD    ketorolac (TORADOL) injection 30 mg, 30 mg, Intravenous, Q8H, Patsy Humphries MD    ibuprofen (ADVIL;MOTRIN) tablet 800 mg, 800 mg, Oral, 3 times per day, Patsy Humphries MD, 800 mg at 05/30/21 1032    acetaminophen (TYLENOL) tablet 1,000 mg, 1,000 mg, Oral, 3 times per day, Patsy Humphries MD, 1,000 mg at 05/30/21 0600    oxyCODONE (ROXICODONE) immediate release tablet 5 mg, 5 mg, Oral, Q4H PRN, 5 mg at 05/28/21 2113 **OR** oxyCODONE (ROXICODONE) immediate release tablet 10 mg, 10 mg, Oral, Q4H PRN, Katey Early

## 2021-05-31 VITALS
OXYGEN SATURATION: 98 % | BODY MASS INDEX: 23.9 KG/M2 | HEIGHT: 64 IN | SYSTOLIC BLOOD PRESSURE: 132 MMHG | TEMPERATURE: 97.9 F | RESPIRATION RATE: 18 BRPM | WEIGHT: 140 LBS | HEART RATE: 86 BPM | DIASTOLIC BLOOD PRESSURE: 85 MMHG

## 2021-05-31 PROCEDURE — 6370000000 HC RX 637 (ALT 250 FOR IP): Performed by: OBSTETRICS & GYNECOLOGY

## 2021-05-31 RX ADMIN — ACETAMINOPHEN 1000 MG: 500 TABLET ORAL at 08:03

## 2021-05-31 RX ADMIN — OXYCODONE 10 MG: 5 TABLET ORAL at 05:43

## 2021-05-31 RX ADMIN — IBUPROFEN 800 MG: 800 TABLET, FILM COATED ORAL at 05:44

## 2021-05-31 RX ADMIN — FERROUS SULFATE TAB 325 MG (65 MG ELEMENTAL FE) 325 MG: 325 (65 FE) TAB at 08:03

## 2021-05-31 ASSESSMENT — PAIN DESCRIPTION - DESCRIPTORS: DESCRIPTORS: ACHING;BURNING;DISCOMFORT

## 2021-05-31 ASSESSMENT — PAIN SCALES - GENERAL
PAINLEVEL_OUTOF10: 6
PAINLEVEL_OUTOF10: 9

## 2021-05-31 NOTE — CARE COORDINATION
CM was requested to speak with patient who will be discharging home today. She is aware that the infant will need to stay here to reach goal weight and for parent to find appropriate housing. Patient is fearful that staff will consider her as abandoning infant when she discharges. CM has spoken with nursing and reassured patient that as long as she keeps her own ID on she may come visit and feed her infant. CM has attempted to reach [facility]  To determine what additional plans are in place with no response. Patient is aware that this agency has to complete its own review process.     ADITI MathisN, CCM, RN  [facility]  [phone]

## 2021-05-31 NOTE — FLOWSHEET NOTE
Postpartum discharge teaching completed and forms signed by patient. Copy witnessed by RN and given to patient. Prescriptions given to pt. Patient plans to follow-up with [place] Provider as instructed. Patient verbalizes understanding of discharge instructions and denies further questions. Infant remains in the special care nursery.

## 2021-05-31 NOTE — PROGRESS NOTES
Subjective:     Postpartum Day 4:  Delivery    The patient feels tired. The patient denies emotional concerns. Pain is moderately controlled with current medications. The baby is in SCN. Patient is pumping with some difficulty. Urinary output is adequate. The patient is ambulating well. The patient is tolerating a normal diet. Flatus has been passed. Objective:      Patient Vitals for the past 8 hrs:   BP Temp Temp src Pulse Resp   21 0803 132/85 97.9 °F (36.6 °C) Oral 86 18     General:    alert, appears stated age and cooperative       Lochia:  appropriate   Uterine Fundus:   firm   Incision:  healing well, no significant drainage, no dehiscence, no significant erythema   DVT Evaluation:  No evidence of DVT seen on physical exam.     Lab Results   Component Value Date    WBC 11.4 (H) 2021    HGB 9.7 (L) 2021    HCT 28.9 (L) 2021    MCV 80.9 2021     2021        Assessment:     Status post  section. Doing well postoperatively. Plan:     Discharge home with standard precautions and return to clinic in 4-6 weeks.

## 2023-04-26 ENCOUNTER — OFFICE VISIT (OUTPATIENT)
Dept: FAMILY MEDICINE CLINIC | Age: 38
End: 2023-04-26
Payer: COMMERCIAL

## 2023-04-26 VITALS
HEIGHT: 64 IN | BODY MASS INDEX: 22.2 KG/M2 | DIASTOLIC BLOOD PRESSURE: 74 MMHG | WEIGHT: 130 LBS | SYSTOLIC BLOOD PRESSURE: 134 MMHG

## 2023-04-26 DIAGNOSIS — I15.9 SECONDARY HYPERTENSION: ICD-10-CM

## 2023-04-26 DIAGNOSIS — D35.2 PITUITARY ADENOMA (HCC): Primary | ICD-10-CM

## 2023-04-26 DIAGNOSIS — R11.0 NAUSEA: ICD-10-CM

## 2023-04-26 DIAGNOSIS — F19.11 HX OF DRUG ABUSE (HCC): ICD-10-CM

## 2023-04-26 DIAGNOSIS — Z76.89 ENCOUNTER TO ESTABLISH CARE: ICD-10-CM

## 2023-04-26 DIAGNOSIS — Z13.31 POSITIVE DEPRESSION SCREENING: ICD-10-CM

## 2023-04-26 DIAGNOSIS — F33.1 MODERATE EPISODE OF RECURRENT MAJOR DEPRESSIVE DISORDER (HCC): ICD-10-CM

## 2023-04-26 DIAGNOSIS — Z86.2 HX OF IRON DEFICIENCY ANEMIA: ICD-10-CM

## 2023-04-26 DIAGNOSIS — Z13.220 SCREENING FOR LIPID DISORDERS: ICD-10-CM

## 2023-04-26 PROCEDURE — G8427 DOCREV CUR MEDS BY ELIG CLIN: HCPCS

## 2023-04-26 PROCEDURE — 36415 COLL VENOUS BLD VENIPUNCTURE: CPT

## 2023-04-26 PROCEDURE — 99204 OFFICE O/P NEW MOD 45 MIN: CPT

## 2023-04-26 PROCEDURE — 4004F PT TOBACCO SCREEN RCVD TLK: CPT

## 2023-04-26 PROCEDURE — G8420 CALC BMI NORM PARAMETERS: HCPCS

## 2023-04-26 RX ORDER — ONDANSETRON 4 MG/1
4 TABLET, ORALLY DISINTEGRATING ORAL 3 TIMES DAILY PRN
Qty: 21 TABLET | Refills: 0 | Status: SHIPPED | OUTPATIENT
Start: 2023-04-26

## 2023-04-26 RX ORDER — FERROUS SULFATE 325(65) MG
TABLET ORAL
COMMUNITY
Start: 2022-10-27

## 2023-04-26 RX ORDER — BUPROPION HYDROCHLORIDE 150 MG/1
150 TABLET ORAL EVERY MORNING
Qty: 30 TABLET | Refills: 3 | Status: SHIPPED | OUTPATIENT
Start: 2023-04-26

## 2023-04-26 RX ORDER — NIFEDIPINE 30 MG/1
30 TABLET, FILM COATED, EXTENDED RELEASE ORAL DAILY
COMMUNITY

## 2023-04-26 RX ORDER — FAMOTIDINE 20 MG/1
20 TABLET, FILM COATED ORAL 2 TIMES DAILY
COMMUNITY

## 2023-04-26 RX ORDER — BUTALBITAL, ACETAMINOPHEN AND CAFFEINE 50; 325; 40 MG/1; MG/1; MG/1
1-2 TABLET ORAL EVERY 4 HOURS PRN
COMMUNITY
Start: 2022-11-01

## 2023-04-26 RX ORDER — CYCLOBENZAPRINE HCL 5 MG
5 TABLET ORAL 3 TIMES DAILY PRN
COMMUNITY
Start: 2022-11-01

## 2023-04-26 RX ORDER — ONDANSETRON 4 MG/1
4 TABLET, FILM COATED ORAL EVERY 8 HOURS PRN
COMMUNITY

## 2023-04-26 SDOH — ECONOMIC STABILITY: HOUSING INSECURITY
IN THE LAST 12 MONTHS, WAS THERE A TIME WHEN YOU DID NOT HAVE A STEADY PLACE TO SLEEP OR SLEPT IN A SHELTER (INCLUDING NOW)?: NO

## 2023-04-26 SDOH — ECONOMIC STABILITY: INCOME INSECURITY: HOW HARD IS IT FOR YOU TO PAY FOR THE VERY BASICS LIKE FOOD, HOUSING, MEDICAL CARE, AND HEATING?: NOT HARD AT ALL

## 2023-04-26 SDOH — ECONOMIC STABILITY: FOOD INSECURITY: WITHIN THE PAST 12 MONTHS, THE FOOD YOU BOUGHT JUST DIDN'T LAST AND YOU DIDN'T HAVE MONEY TO GET MORE.: NEVER TRUE

## 2023-04-26 SDOH — ECONOMIC STABILITY: FOOD INSECURITY: WITHIN THE PAST 12 MONTHS, YOU WORRIED THAT YOUR FOOD WOULD RUN OUT BEFORE YOU GOT MONEY TO BUY MORE.: NEVER TRUE

## 2023-04-26 ASSESSMENT — PATIENT HEALTH QUESTIONNAIRE - PHQ9
4. FEELING TIRED OR HAVING LITTLE ENERGY: 3
1. LITTLE INTEREST OR PLEASURE IN DOING THINGS: 0
9. THOUGHTS THAT YOU WOULD BE BETTER OFF DEAD, OR OF HURTING YOURSELF: 0
6. FEELING BAD ABOUT YOURSELF - OR THAT YOU ARE A FAILURE OR HAVE LET YOURSELF OR YOUR FAMILY DOWN: 1
SUM OF ALL RESPONSES TO PHQ QUESTIONS 1-9: 11
7. TROUBLE CONCENTRATING ON THINGS, SUCH AS READING THE NEWSPAPER OR WATCHING TELEVISION: 3
10. IF YOU CHECKED OFF ANY PROBLEMS, HOW DIFFICULT HAVE THESE PROBLEMS MADE IT FOR YOU TO DO YOUR WORK, TAKE CARE OF THINGS AT HOME, OR GET ALONG WITH OTHER PEOPLE: 1
5. POOR APPETITE OR OVEREATING: 2
3. TROUBLE FALLING OR STAYING ASLEEP: 0
SUM OF ALL RESPONSES TO PHQ QUESTIONS 1-9: 11
SUM OF ALL RESPONSES TO PHQ QUESTIONS 1-9: 11
SUM OF ALL RESPONSES TO PHQ9 QUESTIONS 1 & 2: 0
2. FEELING DOWN, DEPRESSED OR HOPELESS: 0
8. MOVING OR SPEAKING SO SLOWLY THAT OTHER PEOPLE COULD HAVE NOTICED. OR THE OPPOSITE, BEING SO FIGETY OR RESTLESS THAT YOU HAVE BEEN MOVING AROUND A LOT MORE THAN USUAL: 2
SUM OF ALL RESPONSES TO PHQ QUESTIONS 1-9: 11

## 2023-04-26 ASSESSMENT — ENCOUNTER SYMPTOMS
EYE DISCHARGE: 0
ABDOMINAL PAIN: 0
COUGH: 0
CHEST TIGHTNESS: 0
CONSTIPATION: 0
EYE PAIN: 0
DIARRHEA: 0
SHORTNESS OF BREATH: 0
COLOR CHANGE: 0
ABDOMINAL DISTENTION: 0
SORE THROAT: 0

## 2023-04-26 NOTE — PROGRESS NOTES
Blood drawn per order. Needle size: 23 g butterfly  Site: L Antecubital.  First attempt successful No    Second attempt no, grabbed another staff    Pressure applied until bleeding stopped. Cotton ball and band aid applied. Patient informed to call office or return if bleeding reoccurs and unable to stop.     Tubes drawn: 0 purple     0 red
Number of Places Lived in the Last Year: Not on file    Unstable Housing in the Last Year: No        Family History   Problem Relation Age of Onset    Heart Disease Mother     Kidney Disease Father     Diabetes Father     Heart Disease Father     Coronary Art Dis Father        PE  Vitals:    04/26/23 1014 04/26/23 1039   BP: 122/78 134/74   Site: Left Upper Arm    Position: Sitting    Cuff Size: Medium Adult    Weight: 130 lb (59 kg)    Height: 5' 4\" (1.626 m)      Estimated body mass index is 22.31 kg/m² as calculated from the following:    Height as of this encounter: 5' 4\" (1.626 m). Weight as of this encounter: 130 lb (59 kg). Physical Exam  Constitutional:       General: She is not in acute distress. Appearance: Normal appearance. She is not ill-appearing. HENT:      Head: Normocephalic. Right Ear: Tympanic membrane and external ear normal.      Left Ear: Tympanic membrane and external ear normal.      Nose: No congestion or rhinorrhea. Mouth/Throat:      Mouth: Mucous membranes are moist.      Pharynx: Oropharynx is clear. No posterior oropharyngeal erythema. Eyes:      Extraocular Movements: Extraocular movements intact. Conjunctiva/sclera: Conjunctivae normal.      Pupils: Pupils are equal, round, and reactive to light. Comments: Poor peripheral vision     Cardiovascular:      Rate and Rhythm: Normal rate and regular rhythm. Pulses: Normal pulses. Heart sounds: Normal heart sounds. No murmur heard. Pulmonary:      Effort: Pulmonary effort is normal. No respiratory distress. Breath sounds: Normal breath sounds. No wheezing. Abdominal:      General: Abdomen is flat. Bowel sounds are normal.      Palpations: Abdomen is soft. Tenderness: There is no abdominal tenderness. Hernia: No hernia is present. Musculoskeletal:         General: No swelling. Normal range of motion. Cervical back: Normal range of motion and neck supple. No tenderness.

## 2023-04-27 LAB
ALBUMIN SERPL-MCNC: 4.7 G/DL (ref 3.4–5)
ALBUMIN/GLOB SERPL: 2 {RATIO} (ref 1.1–2.2)
ALP SERPL-CCNC: 59 U/L (ref 40–129)
ALT SERPL-CCNC: 11 U/L (ref 10–40)
ANION GAP SERPL CALCULATED.3IONS-SCNC: 15 MMOL/L (ref 3–16)
AST SERPL-CCNC: 18 U/L (ref 15–37)
BASOPHILS # BLD: 0 K/UL (ref 0–0.2)
BASOPHILS NFR BLD: 0.7 %
BILIRUB SERPL-MCNC: 0.3 MG/DL (ref 0–1)
BUN SERPL-MCNC: 11 MG/DL (ref 7–20)
CALCIUM SERPL-MCNC: 9.6 MG/DL (ref 8.3–10.6)
CHLORIDE SERPL-SCNC: 101 MMOL/L (ref 99–110)
CHOLEST SERPL-MCNC: 204 MG/DL (ref 0–199)
CO2 SERPL-SCNC: 23 MMOL/L (ref 21–32)
CREAT SERPL-MCNC: 0.7 MG/DL (ref 0.6–1.1)
DEPRECATED RDW RBC AUTO: 16.9 % (ref 12.4–15.4)
EOSINOPHIL # BLD: 0.1 K/UL (ref 0–0.6)
EOSINOPHIL NFR BLD: 2.2 %
FERRITIN SERPL IA-MCNC: 37.9 NG/ML (ref 15–150)
GFR SERPLBLD CREATININE-BSD FMLA CKD-EPI: >60 ML/MIN/{1.73_M2}
GLUCOSE SERPL-MCNC: 83 MG/DL (ref 70–99)
HCT VFR BLD AUTO: 42.7 % (ref 36–48)
HDLC SERPL-MCNC: 82 MG/DL (ref 40–60)
HGB BLD-MCNC: 14 G/DL (ref 12–16)
IRON SATN MFR SERPL: 39 % (ref 15–50)
IRON SERPL-MCNC: 167 UG/DL (ref 37–145)
LDL CHOLESTEROL CALCULATED: 97 MG/DL
LYMPHOCYTES # BLD: 2.5 K/UL (ref 1–5.1)
LYMPHOCYTES NFR BLD: 50.6 %
MCH RBC QN AUTO: 29.4 PG (ref 26–34)
MCHC RBC AUTO-ENTMCNC: 32.7 G/DL (ref 31–36)
MCV RBC AUTO: 89.8 FL (ref 80–100)
MONOCYTES # BLD: 0.3 K/UL (ref 0–1.3)
MONOCYTES NFR BLD: 5.5 %
NEUTROPHILS # BLD: 2 K/UL (ref 1.7–7.7)
NEUTROPHILS NFR BLD: 41 %
PLATELET # BLD AUTO: 192 K/UL (ref 135–450)
PMV BLD AUTO: 9.4 FL (ref 5–10.5)
POTASSIUM SERPL-SCNC: 4.1 MMOL/L (ref 3.5–5.1)
PROT SERPL-MCNC: 7 G/DL (ref 6.4–8.2)
RBC # BLD AUTO: 4.76 M/UL (ref 4–5.2)
SODIUM SERPL-SCNC: 139 MMOL/L (ref 136–145)
TIBC SERPL-MCNC: 432 UG/DL (ref 260–445)
TRIGL SERPL-MCNC: 125 MG/DL (ref 0–150)
TSH SERPL DL<=0.005 MIU/L-ACNC: 3.39 UIU/ML (ref 0.27–4.2)
VLDLC SERPL CALC-MCNC: 25 MG/DL
WBC # BLD AUTO: 4.9 K/UL (ref 4–11)

## 2023-04-28 NOTE — RESULT ENCOUNTER NOTE
Cholesterol panel looks okay    Iron level is sufficient    CBC is normal    Kidney function panel is normal    TSH normal    Ferritin within normal limits

## 2023-05-14 ENCOUNTER — HOSPITAL ENCOUNTER (EMERGENCY)
Age: 38
Discharge: HOME | End: 2023-05-14
Attending: EMERGENCY MEDICINE
Payer: COMMERCIAL

## 2023-05-14 VITALS
DIASTOLIC BLOOD PRESSURE: 74 MMHG | WEIGHT: 133.2 LBS | BODY MASS INDEX: 22.74 KG/M2 | RESPIRATION RATE: 16 BRPM | HEIGHT: 64 IN | TEMPERATURE: 99 F | SYSTOLIC BLOOD PRESSURE: 131 MMHG | HEART RATE: 64 BPM | OXYGEN SATURATION: 99 %

## 2023-05-14 DIAGNOSIS — T85.848A DENTAL IMPLANT PAIN, INITIAL ENCOUNTER: ICD-10-CM

## 2023-05-14 DIAGNOSIS — K08.89 PAIN, DENTAL: Primary | ICD-10-CM

## 2023-05-14 PROCEDURE — 6370000000 HC RX 637 (ALT 250 FOR IP): Performed by: EMERGENCY MEDICINE

## 2023-05-14 PROCEDURE — 99284 EMERGENCY DEPT VISIT MOD MDM: CPT

## 2023-05-14 PROCEDURE — 96372 THER/PROPH/DIAG INJ SC/IM: CPT

## 2023-05-14 PROCEDURE — 6360000002 HC RX W HCPCS: Performed by: EMERGENCY MEDICINE

## 2023-05-14 RX ORDER — ONDANSETRON 4 MG/1
4 TABLET, ORALLY DISINTEGRATING ORAL ONCE
Status: COMPLETED | OUTPATIENT
Start: 2023-05-14 | End: 2023-05-14

## 2023-05-14 RX ORDER — ONDANSETRON 4 MG/1
4 TABLET, ORALLY DISINTEGRATING ORAL 3 TIMES DAILY PRN
Qty: 21 TABLET | Refills: 0 | Status: SHIPPED | OUTPATIENT
Start: 2023-05-14

## 2023-05-14 RX ADMIN — ONDANSETRON 4 MG: 4 TABLET, ORALLY DISINTEGRATING ORAL at 14:18

## 2023-05-14 RX ADMIN — HYDROMORPHONE HYDROCHLORIDE 1 MG: 1 INJECTION, SOLUTION INTRAMUSCULAR; INTRAVENOUS; SUBCUTANEOUS at 14:18

## 2023-05-14 ASSESSMENT — PAIN SCALES - GENERAL
PAINLEVEL_OUTOF10: 10
PAINLEVEL_OUTOF10: 5
PAINLEVEL_OUTOF10: 10

## 2023-05-14 ASSESSMENT — PAIN - FUNCTIONAL ASSESSMENT: PAIN_FUNCTIONAL_ASSESSMENT: NONE - DENIES PAIN

## 2023-05-14 ASSESSMENT — PAIN DESCRIPTION - LOCATION
LOCATION: TEETH
LOCATION: TEETH;FACE

## 2023-05-14 ASSESSMENT — PAIN DESCRIPTION - DESCRIPTORS: DESCRIPTORS: ACHING

## 2023-05-14 ASSESSMENT — PAIN DESCRIPTION - ONSET: ONSET: GRADUAL

## 2023-05-14 ASSESSMENT — PAIN DESCRIPTION - FREQUENCY: FREQUENCY: CONTINUOUS

## 2023-05-14 ASSESSMENT — PAIN DESCRIPTION - PAIN TYPE: TYPE: ACUTE PAIN

## 2023-05-18 ENCOUNTER — HOSPITAL ENCOUNTER (EMERGENCY)
Age: 38
Discharge: HOME | End: 2023-05-18
Attending: EMERGENCY MEDICINE
Payer: COMMERCIAL

## 2023-05-18 VITALS
OXYGEN SATURATION: 100 % | SYSTOLIC BLOOD PRESSURE: 156 MMHG | HEIGHT: 64 IN | WEIGHT: 133 LBS | DIASTOLIC BLOOD PRESSURE: 97 MMHG | BODY MASS INDEX: 22.71 KG/M2 | TEMPERATURE: 98.4 F | RESPIRATION RATE: 16 BRPM | HEART RATE: 85 BPM

## 2023-05-18 DIAGNOSIS — K08.89 PAIN, DENTAL: Primary | ICD-10-CM

## 2023-05-18 PROCEDURE — 99284 EMERGENCY DEPT VISIT MOD MDM: CPT

## 2023-05-18 PROCEDURE — 6360000002 HC RX W HCPCS: Performed by: EMERGENCY MEDICINE

## 2023-05-18 PROCEDURE — 96372 THER/PROPH/DIAG INJ SC/IM: CPT

## 2023-05-18 RX ORDER — KETOROLAC TROMETHAMINE 30 MG/ML
60 INJECTION, SOLUTION INTRAMUSCULAR; INTRAVENOUS ONCE
Status: COMPLETED | OUTPATIENT
Start: 2023-05-18 | End: 2023-05-18

## 2023-05-18 RX ADMIN — KETOROLAC TROMETHAMINE 60 MG: 30 INJECTION, SOLUTION INTRAMUSCULAR at 22:13

## 2023-05-18 ASSESSMENT — PAIN - FUNCTIONAL ASSESSMENT
PAIN_FUNCTIONAL_ASSESSMENT: NONE - DENIES PAIN
PAIN_FUNCTIONAL_ASSESSMENT: 0-10

## 2023-05-18 ASSESSMENT — PAIN SCALES - GENERAL
PAINLEVEL_OUTOF10: 0
PAINLEVEL_OUTOF10: 10

## 2023-05-18 ASSESSMENT — PAIN DESCRIPTION - LOCATION: LOCATION: TEETH

## 2023-05-18 ASSESSMENT — PAIN DESCRIPTION - ONSET: ONSET: GRADUAL

## 2023-05-18 ASSESSMENT — PAIN DESCRIPTION - FREQUENCY: FREQUENCY: CONTINUOUS

## 2023-05-18 ASSESSMENT — PAIN DESCRIPTION - DESCRIPTORS: DESCRIPTORS: THROBBING

## 2023-05-18 ASSESSMENT — PAIN DESCRIPTION - PAIN TYPE: TYPE: ACUTE PAIN

## 2023-05-19 NOTE — ED PROVIDER NOTES
[address] [facility]     Pt Name: Niki Purdy   MRN: [id number]   [place] 1985   Date of evaluation: 5/18/2023   Provider: Gianluca Hernandez MD   PCP: OVIDIO Matamoros CNP   Note Started: 10:10 PM EDT 5/18/23     CHIEF COMPLAINT     Chief Complaint   Patient presents with    Dental Pain     Pt was seen here on Sunday for pain and swelling after having dental implants placed, pt went to follow up appointment Monday morning and they informed her they had put the wrong teeth in and pulled them back out. Pt states increased pain and swelling. HISTORY OF PRESENT ILLNESS:  History from : Patient   Limitations to history : None     Gloria Pretty is a 41 y.o. female who presents c/o dental pain. This patient had dental work performed approximately 6 days ago. She had multiple teeth pulled to her upper gumline and then she had a dental implant placed. She states she has Percocet at home and she has Zofran at home but she was out and about and did not have her Percocet with her and her pain has worsened. She does state that she has Zofran at home. No fever. No chills. No change in her voice. Nursing Notes were all reviewed and agreed with or any disagreements were addressed in the HPI.     ROS: Positives and Pertinent negatives as per HPI. PAST MEDICAL HISTORY     PHYSICAL EXAM:  ED Triage Vitals [05/18/23 2207]   BP Temp Temp src Pulse Respirations SpO2 Height Weight - Scale   (!) 156/97 -- -- 85 16 100 % 5' 4\" (1.626 m) 133 lb (60.3 kg)        Physical Exam   PHYSICAL EXAM  BP (!) 156/97   Pulse 85   Temp 98.4 °F (36.9 °C) (Tympanic)   Resp 16   Ht 5' 4\" (1.626 m)   Wt 133 lb (60.3 kg)   LMP 03/03/2022   SpO2 100%   BMI 22.83 kg/m²   GENERAL APPEARANCE: Awake and alert. Well appearing. No acute distress. HEAD: Normocephalic. Atraumatic.   EYES: No scleral icterus  ENT: Mucous membranes are moist.  Dental implant

## 2023-08-25 ENCOUNTER — HOSPITAL ENCOUNTER (EMERGENCY)
Age: 38
Discharge: HOME | End: 2023-08-25
Attending: EMERGENCY MEDICINE
Payer: COMMERCIAL

## 2023-08-25 ENCOUNTER — APPOINTMENT (OUTPATIENT)
Dept: GENERAL RADIOLOGY | Age: 38
End: 2023-08-25
Payer: COMMERCIAL

## 2023-08-25 VITALS
BODY MASS INDEX: 23.05 KG/M2 | RESPIRATION RATE: 14 BRPM | SYSTOLIC BLOOD PRESSURE: 134 MMHG | DIASTOLIC BLOOD PRESSURE: 89 MMHG | HEIGHT: 64 IN | WEIGHT: 135 LBS | OXYGEN SATURATION: 98 % | HEART RATE: 89 BPM | TEMPERATURE: 98.2 F

## 2023-08-25 DIAGNOSIS — M54.50 ACUTE BILATERAL LOW BACK PAIN WITHOUT SCIATICA: Primary | ICD-10-CM

## 2023-08-25 PROCEDURE — 6360000002 HC RX W HCPCS: Performed by: EMERGENCY MEDICINE

## 2023-08-25 PROCEDURE — 96372 THER/PROPH/DIAG INJ SC/IM: CPT

## 2023-08-25 PROCEDURE — 6370000000 HC RX 637 (ALT 250 FOR IP): Performed by: EMERGENCY MEDICINE

## 2023-08-25 PROCEDURE — 72100 X-RAY EXAM L-S SPINE 2/3 VWS: CPT

## 2023-08-25 PROCEDURE — 99284 EMERGENCY DEPT VISIT MOD MDM: CPT

## 2023-08-25 RX ORDER — LIDOCAINE 4 G/G
1 PATCH TOPICAL DAILY
Status: DISCONTINUED | OUTPATIENT
Start: 2023-08-25 | End: 2023-08-25 | Stop reason: HOSPADM

## 2023-08-25 RX ORDER — LIDOCAINE 50 MG/G
1 PATCH TOPICAL DAILY
Qty: 5 PATCH | Refills: 0 | Status: SHIPPED | OUTPATIENT
Start: 2023-08-25 | End: 2023-08-30

## 2023-08-25 RX ORDER — ACETAMINOPHEN 325 MG/1
650 TABLET ORAL EVERY 6 HOURS PRN
Qty: 40 TABLET | Refills: 0 | Status: SHIPPED | OUTPATIENT
Start: 2023-08-25 | End: 2023-08-30

## 2023-08-25 RX ORDER — DIAZEPAM 2 MG/1
2 TABLET ORAL EVERY 6 HOURS PRN
Qty: 9 TABLET | Refills: 0 | Status: SHIPPED | OUTPATIENT
Start: 2023-08-25 | End: 2023-08-28

## 2023-08-25 RX ORDER — DIAZEPAM 5 MG/1
5 TABLET ORAL ONCE
Status: COMPLETED | OUTPATIENT
Start: 2023-08-25 | End: 2023-08-25

## 2023-08-25 RX ORDER — ACETAMINOPHEN 500 MG
1000 TABLET ORAL ONCE
Status: COMPLETED | OUTPATIENT
Start: 2023-08-25 | End: 2023-08-25

## 2023-08-25 RX ORDER — KETOROLAC TROMETHAMINE 10 MG/1
10 TABLET, FILM COATED ORAL EVERY 6 HOURS PRN
Qty: 20 TABLET | Refills: 0 | Status: SHIPPED | OUTPATIENT
Start: 2023-08-25 | End: 2023-08-30

## 2023-08-25 RX ORDER — KETOROLAC TROMETHAMINE 30 MG/ML
30 INJECTION, SOLUTION INTRAMUSCULAR; INTRAVENOUS ONCE
Status: COMPLETED | OUTPATIENT
Start: 2023-08-25 | End: 2023-08-25

## 2023-08-25 RX ADMIN — DIAZEPAM 5 MG: 5 TABLET ORAL at 13:48

## 2023-08-25 RX ADMIN — KETOROLAC TROMETHAMINE 30 MG: 30 INJECTION, SOLUTION INTRAMUSCULAR; INTRAVENOUS at 13:48

## 2023-08-25 RX ADMIN — ACETAMINOPHEN 1000 MG: 500 TABLET ORAL at 13:47

## 2023-08-25 ASSESSMENT — PAIN - FUNCTIONAL ASSESSMENT
PAIN_FUNCTIONAL_ASSESSMENT: PREVENTS OR INTERFERES WITH ALL ACTIVE AND SOME PASSIVE ACTIVITIES
PAIN_FUNCTIONAL_ASSESSMENT: 0-10

## 2023-08-25 ASSESSMENT — PAIN DESCRIPTION - LOCATION: LOCATION: BACK

## 2023-08-25 ASSESSMENT — PAIN DESCRIPTION - DESCRIPTORS: DESCRIPTORS: SHARP

## 2023-08-25 ASSESSMENT — PAIN DESCRIPTION - ORIENTATION: ORIENTATION: LOWER;MID

## 2023-08-25 ASSESSMENT — LIFESTYLE VARIABLES
HOW MANY STANDARD DRINKS CONTAINING ALCOHOL DO YOU HAVE ON A TYPICAL DAY: PATIENT DOES NOT DRINK
HOW OFTEN DO YOU HAVE A DRINK CONTAINING ALCOHOL: NEVER

## 2023-08-25 ASSESSMENT — PAIN DESCRIPTION - FREQUENCY: FREQUENCY: CONTINUOUS

## 2023-08-25 ASSESSMENT — PAIN SCALES - GENERAL: PAINLEVEL_OUTOF10: 9

## 2023-08-25 ASSESSMENT — PAIN DESCRIPTION - ONSET: ONSET: PROGRESSIVE

## 2023-08-25 ASSESSMENT — PAIN DESCRIPTION - PAIN TYPE: TYPE: ACUTE PAIN

## 2023-08-25 NOTE — ED NOTES
Patient reports midline lumbar pain that started yesterday while sweeping. States the pain increased today while trying to  her child. Denies bowel/bladder symptoms. Ambulatory to the room from the lobby with slow, steady gait at the time of triage.       Eulalio Blanco RN  08/25/23 [id number]

## 2023-08-25 NOTE — ED NOTES
AVS provided and reviewed with the patient. The patient verbalized understanding of care at home, follow up care, and emergent symptoms to return for. The patient verbalized understanding of medication side effects and restrictions. No questions or concerns verbalized at this time. The patient is alert, oriented, stable, and ambulatory out of the department at the time of discharge.        Tai Burns RN  08/25/23 3591

## 2023-08-28 ASSESSMENT — ENCOUNTER SYMPTOMS
SHORTNESS OF BREATH: 0
VOMITING: 0
COUGH: 0
ABDOMINAL PAIN: 0
RHINORRHEA: 0
DIARRHEA: 0
CHEST TIGHTNESS: 0
BACK PAIN: 1

## 2023-09-25 ENCOUNTER — OFFICE VISIT (OUTPATIENT)
Dept: FAMILY MEDICINE CLINIC | Age: 38
End: 2023-09-25
Payer: COMMERCIAL

## 2023-09-25 VITALS — DIASTOLIC BLOOD PRESSURE: 78 MMHG | WEIGHT: 140 LBS | BODY MASS INDEX: 24.03 KG/M2 | SYSTOLIC BLOOD PRESSURE: 118 MMHG

## 2023-09-25 DIAGNOSIS — M54.50 ACUTE RIGHT-SIDED LOW BACK PAIN WITHOUT SCIATICA: Primary | ICD-10-CM

## 2023-09-25 DIAGNOSIS — M62.838 MUSCLE SPASM: ICD-10-CM

## 2023-09-25 PROCEDURE — G8427 DOCREV CUR MEDS BY ELIG CLIN: HCPCS

## 2023-09-25 PROCEDURE — G8420 CALC BMI NORM PARAMETERS: HCPCS

## 2023-09-25 PROCEDURE — 4004F PT TOBACCO SCREEN RCVD TLK: CPT

## 2023-09-25 PROCEDURE — 99214 OFFICE O/P EST MOD 30 MIN: CPT

## 2023-09-25 RX ORDER — METHOCARBAMOL 750 MG/1
750 TABLET, FILM COATED ORAL 4 TIMES DAILY
Qty: 40 TABLET | Refills: 0 | Status: SHIPPED | OUTPATIENT
Start: 2023-09-25 | End: 2023-10-05

## 2023-09-25 RX ORDER — METHYLPREDNISOLONE 4 MG/1
TABLET ORAL
Qty: 1 KIT | Refills: 0 | Status: SHIPPED | OUTPATIENT
Start: 2023-09-25 | End: 2023-10-01

## 2023-09-25 ASSESSMENT — ENCOUNTER SYMPTOMS
BOWEL INCONTINENCE: 0
BACK PAIN: 1
ABDOMINAL PAIN: 0

## 2023-09-25 NOTE — PROGRESS NOTES
[facility] Medicine  2023    Sobeida Park (:  1985) is a 41 y.o. female, here for evaluation of the following medical concerns:    Chief Complaint   Patient presents with    Back Pain     First happened a month ago        ASSESSMENT/ PLAN  1. Acute right-sided low back pain without sciatica  -Suspicion for lumbar muscle spasm/ strain versus disc herniation  -Robaxin and Medrol  -X-ray to rule out fracture  -Follow-up in 2 weeks  -No numbness, tingling, perineal numbness, bowel or bladder incontinence; therefore, low suspicion for this herniation  - methocarbamol (ROBAXIN-750) 750 MG tablet; Take 1 tablet by mouth 4 times daily for 10 days  Dispense: 40 tablet; Refill: 0  - XR LUMBAR SPINE (2-3 VIEWS); Future  - methylPREDNISolone (MEDROL DOSEPACK) 4 MG tablet; Take by mouth. Dispense: 1 kit; Refill: 0    2. Muscle spasm  -Methocarbamol  -See 1  - methocarbamol (ROBAXIN-750) 750 MG tablet; Take 1 tablet by mouth 4 times daily for 10 days  Dispense: 40 tablet; Refill: 0       Return in about 2 weeks (around 10/9/2023) for Adderall follow up. Kiana Wilson HPI    Patient seen in office today for chief complaint of lumbar back pain that radiates to sacroiliac joint on the right side. This has been ongoing and gradually worsening over the past month. She reports initial injury happened when she was to take her son up out of a playpen. She reports that pain is described as a sharp and pinching sensation. Reports she has been using ibuprofen and lidocaine patch with no relief in symptoms. She also reports that she has been using Bengay with mild relief in symptoms. Denies any bowel or bladder incontinence or perineal numbness. Denies any weakness in bilateral lower extremities. He reports that exacerbating factors are bending at the waist in a flexion manner as well as twisting at the waist.  She reports that she has support when bending over she does not have any difficulty.   She also reports she

## 2023-09-26 ENCOUNTER — HOSPITAL ENCOUNTER (OUTPATIENT)
Dept: GENERAL RADIOLOGY | Age: 38
Discharge: HOME | End: 2023-09-26
Payer: COMMERCIAL

## 2023-09-26 ENCOUNTER — HOSPITAL ENCOUNTER (OUTPATIENT)
Age: 38
Discharge: HOME | End: 2023-09-26
Payer: COMMERCIAL

## 2023-09-26 DIAGNOSIS — M54.50 ACUTE RIGHT-SIDED LOW BACK PAIN WITHOUT SCIATICA: ICD-10-CM

## 2023-09-26 PROCEDURE — 72100 X-RAY EXAM L-S SPINE 2/3 VWS: CPT

## 2023-09-27 DIAGNOSIS — M54.50 ACUTE RIGHT-SIDED LOW BACK PAIN WITHOUT SCIATICA: Primary | ICD-10-CM

## 2023-10-03 ENCOUNTER — HOSPITAL ENCOUNTER (OUTPATIENT)
Dept: MRI IMAGING | Age: 38
Discharge: HOME | End: 2023-10-03

## 2023-10-03 DIAGNOSIS — M54.50 ACUTE RIGHT-SIDED LOW BACK PAIN WITHOUT SCIATICA: ICD-10-CM

## 2023-10-03 PROCEDURE — 72148 MRI LUMBAR SPINE W/O DYE: CPT

## 2023-10-08 ENCOUNTER — HOSPITAL ENCOUNTER (EMERGENCY)
Age: 38
Discharge: HOME | End: 2023-10-08
Attending: INTERNAL MEDICINE

## 2023-10-08 VITALS
TEMPERATURE: 98.7 F | HEIGHT: 64 IN | SYSTOLIC BLOOD PRESSURE: 147 MMHG | DIASTOLIC BLOOD PRESSURE: 107 MMHG | HEART RATE: 98 BPM | BODY MASS INDEX: 23.9 KG/M2 | RESPIRATION RATE: 16 BRPM | OXYGEN SATURATION: 97 % | WEIGHT: 140 LBS

## 2023-10-08 DIAGNOSIS — J06.9 VIRAL UPPER RESPIRATORY INFECTION: Primary | ICD-10-CM

## 2023-10-08 LAB
FLUAV RNA UPPER RESP QL NAA+PROBE: NEGATIVE
FLUBV AG NPH QL: NEGATIVE
SARS-COV-2 RDRP RESP QL NAA+PROBE: NOT DETECTED

## 2023-10-08 PROCEDURE — 87635 SARS-COV-2 COVID-19 AMP PRB: CPT

## 2023-10-08 PROCEDURE — 87804 INFLUENZA ASSAY W/OPTIC: CPT

## 2023-10-08 PROCEDURE — 99283 EMERGENCY DEPT VISIT LOW MDM: CPT

## 2023-10-08 RX ORDER — GUAIFENESIN AND DEXTROMETHORPHAN HYDROBROMIDE 600; 30 MG/1; MG/1
1 TABLET, EXTENDED RELEASE ORAL 2 TIMES DAILY PRN
Qty: 28 TABLET | Refills: 0 | Status: SHIPPED | OUTPATIENT
Start: 2023-10-08

## 2023-10-08 RX ORDER — CETIRIZINE HYDROCHLORIDE, PSEUDOEPHEDRINE HYDROCHLORIDE 5; 120 MG/1; MG/1
1 TABLET, FILM COATED, EXTENDED RELEASE ORAL 2 TIMES DAILY
Qty: 60 TABLET | Refills: 0 | Status: SHIPPED | OUTPATIENT
Start: 2023-10-08 | End: 2023-11-07

## 2023-10-08 ASSESSMENT — PAIN - FUNCTIONAL ASSESSMENT: PAIN_FUNCTIONAL_ASSESSMENT: NONE - DENIES PAIN

## 2023-10-08 NOTE — ED NOTES
Pt discharge instructions, follow up and rx x 2 reviewed with pt. Pt verbalized understanding. No further needs. Pt discharged at this time.         Cordell Tinsley RN  10/08/23 6151

## 2023-10-08 NOTE — ED PROVIDER NOTES
EMERGENCY MEDICINE PROVIDER NOTE    Patient Identification  Pt Name: Aminata Roque  MRN: [id number]  [address] [facility], [place], [facility] 1985  Date of evaluation: 10/8/2023  Provider: Sydney Martines DO  PCP: OVIDIO Bush CNP    Chief Complaint  URI (Pt having cough/congestion/chills/diarrhea that started Friday. )      HPI  (History provided by patient)  This is a 41 y.o. female who was brought in by self for cough, congestion, chills and diarrhea since Friday. Patient states that when she coughs she now is coughing up some green sputum. She is also complaining of mostly nasal congestion. She denies any chest pain or shortness of breath. Patient denies any fever or chills.     I have reviewed the following nursing documentation:  Allergies: Prochlorperazine, Adhesive tape, Amoxicillin, Benadryl [diphenhydramine], Decadron [dexamethasone], Penicillins, Phenergan [promethazine hcl], Prochlorperazine edisylate, Reglan [metoclopramide hcl], and Stadol [butorphanol tartrate]    Past medical history:   Past Medical History:   Diagnosis Date    ADHD (attention deficit hyperactivity disorder)     Asthma     Collapsed lung     Drug abuse ([address])     Kidney stones     Pituitary adenoma ([address])     Pituitary tumor     PNA (pneumonia)     Tonsillitis      Past surgical history:   Past Surgical History:   Procedure Laterality Date     SECTION N/A 2021     SECTION performed by Ashley Escalante MD at [facility] L&D OR     SECTION  10/24/2022    FOOT SURGERY      RIGHT -- UNSURE WHY    MOUTH SURGERY         Home medications:   Discharge Medication List as of 10/8/2023 11:28 AM        CONTINUE these medications which have NOT CHANGED    Details   ketorolac (TORADOL) 10 MG tablet Take 1 tablet by mouth every 6 hours as needed for Pain TAKE WITH FOOD, Disp-20 tablet, R-0Normal      acetaminophen (AMINOFEN) 325 MG tablet Take 2 tablets by mouth every 6 hours as needed for Pain, Disp-40 tablet, R-0Normal

## 2023-10-09 ENCOUNTER — OFFICE VISIT (OUTPATIENT)
Dept: FAMILY MEDICINE CLINIC | Age: 38
End: 2023-10-09

## 2023-10-09 VITALS — WEIGHT: 139 LBS | SYSTOLIC BLOOD PRESSURE: 132 MMHG | BODY MASS INDEX: 23.86 KG/M2 | DIASTOLIC BLOOD PRESSURE: 88 MMHG

## 2023-10-09 DIAGNOSIS — F90.2 ATTENTION DEFICIT HYPERACTIVITY DISORDER (ADHD), COMBINED TYPE: Primary | ICD-10-CM

## 2023-10-09 DIAGNOSIS — M54.50 ACUTE RIGHT-SIDED LOW BACK PAIN WITHOUT SCIATICA: ICD-10-CM

## 2023-10-09 DIAGNOSIS — R06.2 WHEEZING: ICD-10-CM

## 2023-10-09 PROCEDURE — 4004F PT TOBACCO SCREEN RCVD TLK: CPT

## 2023-10-09 PROCEDURE — G8427 DOCREV CUR MEDS BY ELIG CLIN: HCPCS

## 2023-10-09 PROCEDURE — G8420 CALC BMI NORM PARAMETERS: HCPCS

## 2023-10-09 PROCEDURE — 99214 OFFICE O/P EST MOD 30 MIN: CPT

## 2023-10-09 PROCEDURE — G8484 FLU IMMUNIZE NO ADMIN: HCPCS

## 2023-10-09 RX ORDER — ALBUTEROL SULFATE 90 UG/1
2 AEROSOL, METERED RESPIRATORY (INHALATION) 4 TIMES DAILY PRN
Qty: 18 G | Refills: 0 | Status: SHIPPED | OUTPATIENT
Start: 2023-10-09 | End: 2023-10-09

## 2023-10-09 RX ORDER — ALBUTEROL SULFATE 90 UG/1
2 AEROSOL, METERED RESPIRATORY (INHALATION) 4 TIMES DAILY PRN
Qty: 18 G | Refills: 0 | Status: SHIPPED | OUTPATIENT
Start: 2023-10-09

## 2023-10-09 RX ORDER — HYDROCODONE BITARTRATE AND ACETAMINOPHEN 5; 325 MG/1; MG/1
1 TABLET ORAL EVERY 8 HOURS PRN
Qty: 9 TABLET | Refills: 0 | Status: SHIPPED | OUTPATIENT
Start: 2023-10-09 | End: 2023-10-12

## 2023-10-09 ASSESSMENT — ENCOUNTER SYMPTOMS
ABDOMINAL PAIN: 0
BACK PAIN: 1

## 2023-10-09 NOTE — PROGRESS NOTES
Prochlorperazine Edisylate     Reglan [Metoclopramide Hcl]     Stadol [Butorphanol Tartrate]      Past Medical History:   Diagnosis Date    ADHD (attention deficit hyperactivity disorder)     Asthma     Collapsed lung     Drug abuse ([address])     Kidney stones     Pituitary adenoma ([address])     Pituitary tumor     PNA (pneumonia)     Tonsillitis      Patient Active Problem List   Diagnosis    Cellulitis    Injury of left hand    Cat bite    History of marijuana use    Smoker    Attention deficit hyperactivity disorder (ADHD)    Elevated sed rate    Elevated C-reactive protein (CRP)    Bandemia    Encounter for medication counseling    Drug or alcohol risk assessment or counseling    Tobacco abuse counseling    Intrauterine growth restriction (IUGR) affecting care of mother, third trimester, single gestation    Insufficient prenatal care    High risk pregnancy due to maternal drug abuse in third trimester ([address])    High risk pregnancy due to smoking in third trimester    S/P  section    Pituitary adenoma [facility])     Past Surgical History:   Procedure Laterality Date     SECTION N/A 2021     SECTION performed by Neal Quevedo MD at [facility] L&D OR     SECTION  10/24/2022    FOOT SURGERY      RIGHT -- UNSURE WHY    MOUTH SURGERY       Social History     Socioeconomic History    Marital status:      Spouse name: Not on file    Number of children: Not on file    Years of education: Not on file    Highest education level: Not on file   Occupational History    Not on file   Tobacco Use    Smoking status: Every Day     Packs/day: .5     Types: Cigarettes    Smokeless tobacco: Never   Vaping Use    Vaping Use: Never used   Substance and Sexual Activity    Alcohol use: Yes     Comment: once every few months    Drug use: Not Currently     Types: Marijuana Caesar Keith), Cocaine    Sexual activity: Not on file   Other Topics Concern    Not on file   Social History Narrative    Not on file

## 2023-10-25 ENCOUNTER — TELEPHONE (OUTPATIENT)
Age: 38
End: 2023-10-25

## 2023-10-25 NOTE — TELEPHONE ENCOUNTER
Nurse Ciara Nagy informed provider patient currently in [place] on date of today's scheduled new patient visit. 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 Patient plans to be in [place] visiting sister for next 2 weeks. Patient and sister upset that provider would not complete visit today via telehealth, patient terminated call without rescheduling. If patient calls to reschedule please confirm she will be in state of [place] at time of visit.

## 2023-12-07 ENCOUNTER — HOSPITAL ENCOUNTER (EMERGENCY)
Age: 38
Discharge: LEFT BEFORE DISCHARGE | End: 2023-12-07
Attending: EMERGENCY MEDICINE

## 2023-12-07 VITALS
HEIGHT: 64 IN | BODY MASS INDEX: 23.05 KG/M2 | TEMPERATURE: 98.1 F | DIASTOLIC BLOOD PRESSURE: 88 MMHG | RESPIRATION RATE: 18 BRPM | SYSTOLIC BLOOD PRESSURE: 132 MMHG | OXYGEN SATURATION: 98 % | HEART RATE: 113 BPM | WEIGHT: 135 LBS

## 2023-12-07 DIAGNOSIS — R11.10 VOMITING IN ADULT PATIENT: Primary | ICD-10-CM

## 2023-12-07 LAB
BACTERIA URNS QL MICRO: ABNORMAL /HPF
BILIRUB UR QL STRIP.AUTO: NEGATIVE
CLARITY UR: CLEAR
COLOR UR: YELLOW
EPI CELLS #/AREA URNS HPF: ABNORMAL /HPF (ref 0–5)
FLUAV RNA UPPER RESP QL NAA+PROBE: NEGATIVE
FLUBV AG NPH QL: NEGATIVE
GLUCOSE UR STRIP.AUTO-MCNC: NEGATIVE MG/DL
HCG UR QL: NEGATIVE
HGB UR QL STRIP.AUTO: NEGATIVE
KETONES UR STRIP.AUTO-MCNC: ABNORMAL MG/DL
LEUKOCYTE ESTERASE UR QL STRIP.AUTO: ABNORMAL
NITRITE UR QL STRIP.AUTO: NEGATIVE
PH UR STRIP.AUTO: 5.5 [PH] (ref 5–8)
PROT UR STRIP.AUTO-MCNC: NEGATIVE MG/DL
RBC #/AREA URNS HPF: ABNORMAL /HPF (ref 0–4)
SARS-COV-2 RDRP RESP QL NAA+PROBE: NOT DETECTED
SP GR UR STRIP.AUTO: >=1.03 (ref 1–1.03)
UA COMPLETE W REFLEX CULTURE PNL UR: ABNORMAL
UA DIPSTICK W REFLEX MICRO PNL UR: YES
URN SPEC COLLECT METH UR: ABNORMAL
UROBILINOGEN UR STRIP-ACNC: 0.2 E.U./DL
WBC #/AREA URNS HPF: ABNORMAL /HPF (ref 0–5)

## 2023-12-07 PROCEDURE — 87804 INFLUENZA ASSAY W/OPTIC: CPT

## 2023-12-07 PROCEDURE — 84703 CHORIONIC GONADOTROPIN ASSAY: CPT

## 2023-12-07 PROCEDURE — 99283 EMERGENCY DEPT VISIT LOW MDM: CPT

## 2023-12-07 PROCEDURE — 81001 URINALYSIS AUTO W/SCOPE: CPT

## 2023-12-07 PROCEDURE — 87635 SARS-COV-2 COVID-19 AMP PRB: CPT

## 2023-12-07 RX ORDER — ONDANSETRON 4 MG/1
4 TABLET, ORALLY DISINTEGRATING ORAL ONCE
Status: DISCONTINUED | OUTPATIENT
Start: 2023-12-07 | End: 2023-12-07 | Stop reason: HOSPADM

## 2023-12-07 ASSESSMENT — PAIN SCALES - GENERAL: PAINLEVEL_OUTOF10: 8

## 2023-12-07 ASSESSMENT — PAIN - FUNCTIONAL ASSESSMENT: PAIN_FUNCTIONAL_ASSESSMENT: 0-10

## 2023-12-07 NOTE — ED NOTES
Pt yelling down hallway and dropped her urine off in the room and stated [employer] is your stupid urine sample and you can't even fucking help anyone. \"     Yvrose Wei RN  12/07/23 1854

## 2023-12-07 NOTE — ED PROVIDER NOTES
Emergency Department Encounter    Patient: Juan Giang  MRN: [id number]  : 1985  Date of Evaluation: 2023  ED Provider:  Elsa Kidd MD    Triage Chief Complaint:   Illness (Pt states \"I feel like shit everywhere. Since yesterday. \")    [place]:  Juan Giang is a 41 y.o. female that presents to the ER for evaluation of body cramping myalgias polyarticular pain, complaining of nausea and vomiting. Afebrile on arrival.  Emotionally labile.   No fever no trauma duration of symptoms have been 24-hour    ROS - see HPI, below listed is current ROS at time of my eval:  General:  No fevers, no chills, no weakness  Eyes:  no discharge  ENT:  No sore throat, no nasal congestion  Cardiovascular:  No chest pain, no palpitations  Respiratory:  No shortness of breath, no cough, no wheezing  Gastrointestinal:  + pain, + nausea, no vomiting, no diarrhea  Musculoskeletal: + muscle pain, + joint pain  Skin:  No rash, no pruritis  Neurologic:  no headache  Genitourinary:  No dysuria, no hematuria  Endocrine:  No unexpected weight gain, no unexpected weight loss  Extremities:  no edema, no pain    Past Medical History:   Diagnosis Date    ADHD (attention deficit hyperactivity disorder)     Asthma     Collapsed lung     Drug abuse ([address])     Kidney stones     Pituitary adenoma ([address])     Pituitary tumor     PNA (pneumonia)     Tonsillitis      Past Surgical History:   Procedure Laterality Date     SECTION N/A 2021     SECTION performed by India Barillas MD at [facility] L[facility]D OR     SECTION  10/24/2022    FOOT SURGERY      RIGHT -- UNSURE WHY    MOUTH SURGERY       Family History   Problem Relation Age of Onset    Heart Disease Mother     Kidney Disease Father     Diabetes Father     Heart Disease Father     Coronary Art Dis Father      Social History     Socioeconomic History    Marital status:      Spouse name: Not on file    Number of children: Not on file    Years of

## 2024-12-12 NOTE — ED PROVIDER NOTES
[facility]        Pt Name: Donna Coyne  MRN: [id number]  [place] 1985  Date of evaluation: 3/16/2020  Provider: Lora Sarmiento PA-C  PCP: Cassandra Harris     I have seen and evaluated this patient with my supervising physician Luz Estrada MD.    [address] [place] [address]       Chief Complaint   Patient presents with    Hand Injury     was seen last night for hand pain after cat bite, supposed to be admitted. had to leave ama earlier to get stuff at home. HISTORY OF PRESENT ILLNESS   (Location, Timing/Onset, Context/Setting, Quality, Duration, Modifying Factors, Severity, Associated Signs and Symptoms)  Note limiting factors. Donna Coyne is a 30 y.o. female presents the emergency department with difficulties as it pertains to her left hand. Patient sustained a cat bite wound over both of her hands that occurred on Thursday by her own cat. She states that the cat had been behaving somewhat erratically. It is reported that she was attacked. She states the primary area of concern is over the index finger on her nondominant left hand. Patient has been seen and evaluated here earlier in the day for evaluation of this. She had unfortunately to take care of some issues in the home environment including turning off a heater before she was able to be admitted. When she was here she was treated with antibiotics her rabies vaccination and initiation of treatment for this was completed and it is noted that her tetanus vaccination is currently up-to-date. She has returned out to the emergency department for the planned inpatient hospitalization IV antibiotics and evaluation by orthopedics under the care and direction of  [facility] who was consulted earlier in the evening. Patient continues to have pain and discomfort primarily over the index finger.   She has associated fusiform swelling and rates the pain and discomfort to be 10 out of 10. She has not had documented fevers and or chills and has no additional complaints other than the above-mentioned. Nursing Notes were all reviewed and agreed with or any disagreements were addressed in the HPI. REVIEW OF SYSTEMS    (2-9 systems for level 4, 10 or more for level 5)     Review of Systems   Constitutional: Negative for activity change, chills and fever. Respiratory: Negative for chest tightness and shortness of breath. Cardiovascular: Negative for chest pain. Gastrointestinal: Negative for abdominal pain, diarrhea, nausea and vomiting. Genitourinary: Negative for dysuria and flank pain. Musculoskeletal: Positive for arthralgias and myalgias. Negative for back pain, gait problem and joint swelling. Skin: Positive for color change and wound. Neurological: Negative for weakness and numbness. Positives and Pertinent negatives as per HPI. Except as noted above in the ROS, all other systems were reviewed and negative. PAST MEDICAL HISTORY     Past Medical History:   Diagnosis Date    ADHD (attention deficit hyperactivity disorder)     Asthma     Collapsed lung     Drug abuse ([facility] [address] [address]     Kidney stones     Pituitary tumor     PNA (pneumonia)     Tonsillitis          SURGICAL HISTORY     Past Surgical History:   Procedure Laterality Date    FOOT SURGERY      RIGHT -- UNSURE WHY    MOUTH SURGERY           CURRENTMEDICATIONS       Previous Medications    IBUPROFEN (ADVIL;MOTRIN) 600 MG TABLET    Take 1 tablet by mouth every 8 hours as needed for Pain         ALLERGIES     Adhesive tape; Amoxicillin; Benadryl [diphenhydramine]; Decadron [dexamethasone]; Penicillins; Phenergan [promethazine hcl]; Prochlorperazine edisylate; Reglan [metoclopramide hcl]; and Stadol [butorphanol tartrate]    FAMILYHISTORY     History reviewed. No pertinent family history.        SOCIAL HISTORY       Social History     Tobacco Use    Smoking status: Current Every Day Smoker Packs/day: 0.50     Types: Cigarettes    Smokeless tobacco: Never Used   Substance Use Topics    Alcohol use: Yes     Comment: once every few months    Drug use: Yes     Types: Marijuana       SCREENINGS             PHYSICAL EXAM    (up to 7 for level 4, 8 or more for level 5)     ED Triage Vitals   BP Temp Temp Source Pulse Resp SpO2 Height Weight   03/16/20 0602 03/16/20 0606 03/16/20 0606 03/16/20 0606 03/16/20 0606 03/16/20 0602 03/16/20 0606 03/16/20 0606   125/83 98.4 °F (36.9 °C) Oral 84 17 99 % 5' 4\" (1.626 m) 110 lb (49.9 kg)       Physical Exam  Vitals signs and nursing note reviewed. Constitutional:       General: She is not in acute distress. Appearance: She is well-developed. She is not diaphoretic. HENT:      Head: Normocephalic and atraumatic. Right Ear: External ear normal.      Left Ear: External ear normal.   Eyes:      General: No scleral icterus. Right eye: No discharge. Left eye: No discharge. Conjunctiva/sclera: Conjunctivae normal.   Neck:      Musculoskeletal: Normal range of motion. Vascular: No JVD. Cardiovascular:      Rate and Rhythm: Normal rate and regular rhythm. Heart sounds: No murmur. No friction rub. No gallop. Pulmonary:      Effort: Pulmonary effort is normal. No accessory muscle usage or respiratory distress. Breath sounds: Normal breath sounds. No wheezing, rhonchi or rales. Musculoskeletal:      Left hand: She exhibits decreased range of motion, tenderness, bony tenderness and swelling. She exhibits normal capillary refill, no deformity and no laceration. Normal sensation noted. Decreased strength noted. Comments: Patient's index finger at the PIP is held flexed in a position of comfort at approximately 30 degrees. Passive extension and active extension cause reproducible pain. She does have associated fusiform swelling of the index finger.   Multiple superficial scratches and puncture wounds are noted in and dictation. EKG: All EKG's are interpreted by the Emergency Department Physician in the absence of a cardiologist.  Please see their note for interpretation of EKG. RADIOLOGY:   Non-plain film images such as CT, Ultrasound and MRI are read by the radiologist. Plain radiographic images are visualized and preliminarily interpreted by the ED Provider with the below findings:        Interpretation per the Radiologist below, if available at the time of this note:    No orders to display     Xr Hand Left (min 3 Views)    Result Date: 3/16/2020  EXAMINATION: THREE XRAY VIEWS OF THE LEFT HAND 3/16/2020 1:08 am COMPARISON: None. HISTORY: ORDERING SYSTEM PROVIDED HISTORY: injury TECHNOLOGIST PROVIDED HISTORY: Reason for exam:->injury FINDINGS: No acute fracture or subluxation. No focal osseous lesion. No significant degenerative changes. No acute osseous abnormality.          PROCEDURES   Unless otherwise noted below, none     Procedures    CRITICAL CARE TIME   N/A    CONSULTS:  IP CONSULT TO HOSPITALIST  PHARMACY TO DOSE VANCOMYCIN      EMERGENCY DEPARTMENT COURSE and DIFFERENTIAL DIAGNOSIS/MDM:   Vitals:    Vitals:    03/16/20 0602 03/16/20 0606   BP: 125/83    Pulse:  84   Resp:  17   Temp:  98.4 °F (36.9 °C)   TempSrc:  Oral   SpO2: 99% 98%   Weight:  110 lb (49.9 kg)   Height:  5' 4\" (1.626 m)       Patient was given the following medications:  Medications   sodium chloride flush 0.9 % injection 10 mL (has no administration in time range)   sodium chloride flush 0.9 % injection 10 mL (has no administration in time range)   morphine injection 4 mg (4 mg Intravenous Given 3/16/20 0628)   cefTRIAXone (ROCEPHIN) 1 g in sterile water 10 mL IV syringe (has no administration in time range)   cefTRIAXone (ROCEPHIN) 2 g IVPB in D5W 50ml minibag (2 g Intravenous New Bag 3/16/20 0628)   vancomycin (VANCOCIN) 750 mg in dextrose 5 % 250 mL IVPB (750 mg Intravenous New Bag 3/16/20 0634)   ondansetron (ZOFRAN) injection 4 mg (4 mg Intravenous Given 3/16/20 5944)       The patient's detailed history of present illness is documented as above. Upon arrival to the emergency department the patient's vital signs are as documented. The patient is noted to be hemodynamically stable and afebrile. Physical examination findings are as above. I have reviewed the patient's previous emergency department visit. She has already been given rabies vaccination she is up-to-date with her tetanus already had hand radiographs completed and they had already had the opportunity to speak directly with Dr. Jarad Morales in regards the above-mentioned. Upon her return her baseline laboratory testing was initiated and documented as above. She has mild leukocytosis at 11 with a shift no evidence significant anemia with a hematocrit of 35.7 no evidence of thrombocytosis or thrombocytopenia. Basic metabolic panel is as documented and her sed rate is mildly elevated CRP is in process. Lactic is normal and blood cultures are pending. I did initiate treatment for this patient here in the emergency department setting with vancomycin Rocephin. She is also been given medications for pain. She will require ongoing antibiotic care will be seen and evaluated by orthopedics. She will be made n.p.o. after midnight tonight for the possibility of I&D which is very low planned for tomorrow. Case was discussed with the hospitalist.  Patient be admitted to the medical surgical services for ongoing care management the diagnoses below. FINAL IMPRESSION      1. Injury of left hand, initial encounter    2.  Cat bite, initial encounter          DISPOSITION/PLAN   DISPOSITION Admitted 03/16/2020 07:17:43 AM         (Please note that portions of this note were completed with a voice recognition program.  Efforts were made to edit the dictations but occasionally words are mis-transcribed.)    Chanell Santiago PA-C (electronically signed)           Enedina Hutchins PA-C  03/16/20 6502 For information on Fall & Injury Prevention, visit: [email or web address] OR  [email or web address] OR  https://www.cdc.gov/steadi/patient.html

## (undated) DEVICE — BANDAGE COMPR W3INXL5YD COT ZN OXIDE TAN E ADH HVYWT

## (undated) DEVICE — 9165 UNIVERSAL PATIENT PLATE: Brand: 3M™

## (undated) DEVICE — SUTURE VCRL SZ 0 L36IN ABSRB UD L36MM CT-1 1/2 CIR J946H

## (undated) DEVICE — SUTURE VCRL SZ 3-0 L27IN ABSRB UD L36MM CT-1 1/2 CIR J258H

## (undated) DEVICE — BLADE CLIPPER GEN PURP NS

## (undated) DEVICE — PAD DRESSING TELFA OUCHLESS NONADH 3X8IN

## (undated) DEVICE — 50" SINGLE PATIENT USE HOVERMATT: Brand: SINGLE PATIENT USE HOVERMATT

## (undated) DEVICE — CHLORAPREP 26ML ORANGE

## (undated) DEVICE — PENCIL SMK EVAC L10FT TBNG NONSTICK ESU BLDE PLUMEPEN ELITE

## (undated) DEVICE — GLOVE ORANGE PI 7 1/2   MSG9075

## (undated) DEVICE — SUTURE VCRL SZ 2-0 L36IN ABSRB UD L36MM CT-1 1/2 CIR J945H

## (undated) DEVICE — Z CONVERTED USE 2275207 CLOTH PREP W7.5XL7.5IN 2% CHG SKIN ALC AND RNS FREE

## (undated) DEVICE — PACK PROCEDURE SURG C SECT REV 1

## (undated) DEVICE — TRAY URIN CATH 16FR DRNGE BG STATLOK STBL DEV F SURSTP